# Patient Record
Sex: MALE | Race: WHITE | NOT HISPANIC OR LATINO | Employment: OTHER | URBAN - NONMETROPOLITAN AREA
[De-identification: names, ages, dates, MRNs, and addresses within clinical notes are randomized per-mention and may not be internally consistent; named-entity substitution may affect disease eponyms.]

---

## 2019-01-09 PROBLEM — S62.502A: Status: ACTIVE | Noted: 2019-01-09

## 2019-01-09 PROBLEM — F17.200 SMOKER: Chronic | Status: ACTIVE | Noted: 2019-01-09

## 2019-01-09 PROBLEM — S62.232A CLOSED DISPLACED FRACTURE OF BASE OF FIRST METACARPAL BONE OF LEFT HAND: Status: ACTIVE | Noted: 2019-01-09

## 2019-01-09 PROBLEM — F12.90 MARIJUANA USE: Status: ACTIVE | Noted: 2019-01-09

## 2019-01-09 PROBLEM — E66.9 OBESITY: Status: ACTIVE | Noted: 2019-01-09

## 2021-11-01 ENCOUNTER — HOSPITAL ENCOUNTER (OUTPATIENT)
Dept: RADIOLOGY | Facility: HOSPITAL | Age: 26
Discharge: HOME | End: 2021-11-01
Attending: STUDENT IN AN ORGANIZED HEALTH CARE EDUCATION/TRAINING PROGRAM
Payer: MEDICAID

## 2021-11-01 ENCOUNTER — OFFICE VISIT (OUTPATIENT)
Dept: PRIMARY CARE CLINIC | Facility: CLINIC | Age: 26
End: 2021-11-01
Payer: MEDICAID

## 2021-11-01 VITALS
HEART RATE: 96 BPM | WEIGHT: 176 LBS | SYSTOLIC BLOOD PRESSURE: 120 MMHG | BODY MASS INDEX: 26.67 KG/M2 | HEIGHT: 68 IN | DIASTOLIC BLOOD PRESSURE: 70 MMHG | OXYGEN SATURATION: 97 % | TEMPERATURE: 99 F

## 2021-11-01 DIAGNOSIS — M75.41 ROTATOR CUFF IMPINGEMENT SYNDROME OF RIGHT SHOULDER: ICD-10-CM

## 2021-11-01 DIAGNOSIS — M25.511 ARTHRALGIA OF RIGHT ACROMIOCLAVICULAR JOINT: ICD-10-CM

## 2021-11-01 DIAGNOSIS — E78.5 HYPERLIPIDEMIA, UNSPECIFIED HYPERLIPIDEMIA TYPE: ICD-10-CM

## 2021-11-01 DIAGNOSIS — M75.41 ROTATOR CUFF IMPINGEMENT SYNDROME OF RIGHT SHOULDER: Primary | ICD-10-CM

## 2021-11-01 DIAGNOSIS — M62.838 MUSCLE SPASMS OF NECK: ICD-10-CM

## 2021-11-01 PROBLEM — E66.9 OBESITY: Status: RESOLVED | Noted: 2019-01-09 | Resolved: 2021-11-01

## 2021-11-01 PROCEDURE — 99204 PR OFFICE/OUTPT VISIT, NEW, LEVL IV, 45-59 MIN: ICD-10-PCS | Mod: S$PBB,,, | Performed by: STUDENT IN AN ORGANIZED HEALTH CARE EDUCATION/TRAINING PROGRAM

## 2021-11-01 PROCEDURE — 99999 PR PBB SHADOW E&M-NEW PATIENT-LVL III: ICD-10-PCS | Mod: PBBFAC,,, | Performed by: STUDENT IN AN ORGANIZED HEALTH CARE EDUCATION/TRAINING PROGRAM

## 2021-11-01 PROCEDURE — 99204 OFFICE O/P NEW MOD 45 MIN: CPT | Mod: S$PBB,,, | Performed by: STUDENT IN AN ORGANIZED HEALTH CARE EDUCATION/TRAINING PROGRAM

## 2021-11-01 PROCEDURE — 99203 OFFICE O/P NEW LOW 30 MIN: CPT | Mod: PBBFAC,PN | Performed by: STUDENT IN AN ORGANIZED HEALTH CARE EDUCATION/TRAINING PROGRAM

## 2021-11-01 PROCEDURE — 73030 X-RAY EXAM OF SHOULDER: CPT | Mod: TC,RT

## 2021-11-01 PROCEDURE — 99999 PR PBB SHADOW E&M-NEW PATIENT-LVL III: CPT | Mod: PBBFAC,,, | Performed by: STUDENT IN AN ORGANIZED HEALTH CARE EDUCATION/TRAINING PROGRAM

## 2021-11-01 RX ORDER — CYCLOBENZAPRINE HCL 10 MG
10 TABLET ORAL 3 TIMES DAILY PRN
Qty: 30 TABLET | Refills: 0 | Status: SHIPPED | OUTPATIENT
Start: 2021-11-01 | End: 2021-11-11

## 2021-11-01 RX ORDER — METHYLPREDNISOLONE 32 MG/1
32 TABLET ORAL DAILY
COMMUNITY
End: 2021-11-03

## 2021-11-01 RX ORDER — IBUPROFEN 600 MG/1
600 TABLET ORAL EVERY 4 HOURS PRN
COMMUNITY
End: 2021-11-08

## 2021-11-08 ENCOUNTER — OFFICE VISIT (OUTPATIENT)
Dept: PRIMARY CARE CLINIC | Facility: CLINIC | Age: 26
End: 2021-11-08
Payer: MEDICAID

## 2021-11-08 VITALS
HEIGHT: 68 IN | TEMPERATURE: 99 F | BODY MASS INDEX: 28.49 KG/M2 | DIASTOLIC BLOOD PRESSURE: 72 MMHG | SYSTOLIC BLOOD PRESSURE: 118 MMHG | HEART RATE: 74 BPM | WEIGHT: 188 LBS

## 2021-11-08 DIAGNOSIS — G89.29 CHRONIC RIGHT SHOULDER PAIN: Primary | Chronic | ICD-10-CM

## 2021-11-08 DIAGNOSIS — M25.511 CHRONIC RIGHT SHOULDER PAIN: Primary | Chronic | ICD-10-CM

## 2021-11-08 DIAGNOSIS — E78.5 HYPERLIPIDEMIA, UNSPECIFIED HYPERLIPIDEMIA TYPE: ICD-10-CM

## 2021-11-08 DIAGNOSIS — F12.90 MARIJUANA USE: ICD-10-CM

## 2021-11-08 PROCEDURE — 99213 OFFICE O/P EST LOW 20 MIN: CPT | Mod: S$PBB,,, | Performed by: STUDENT IN AN ORGANIZED HEALTH CARE EDUCATION/TRAINING PROGRAM

## 2021-11-08 PROCEDURE — 99213 PR OFFICE/OUTPT VISIT, EST, LEVL III, 20-29 MIN: ICD-10-PCS | Mod: S$PBB,,, | Performed by: STUDENT IN AN ORGANIZED HEALTH CARE EDUCATION/TRAINING PROGRAM

## 2021-11-08 PROCEDURE — 99999 PR PBB SHADOW E&M-EST. PATIENT-LVL III: CPT | Mod: PBBFAC,,, | Performed by: STUDENT IN AN ORGANIZED HEALTH CARE EDUCATION/TRAINING PROGRAM

## 2021-11-08 PROCEDURE — 99999 PR PBB SHADOW E&M-EST. PATIENT-LVL III: ICD-10-PCS | Mod: PBBFAC,,, | Performed by: STUDENT IN AN ORGANIZED HEALTH CARE EDUCATION/TRAINING PROGRAM

## 2021-11-08 PROCEDURE — 99213 OFFICE O/P EST LOW 20 MIN: CPT | Mod: PBBFAC,PN | Performed by: STUDENT IN AN ORGANIZED HEALTH CARE EDUCATION/TRAINING PROGRAM

## 2021-11-08 RX ORDER — IBUPROFEN 600 MG/1
600 TABLET ORAL EVERY 6 HOURS PRN
Qty: 40 TABLET | Refills: 1 | Status: SHIPPED | OUTPATIENT
Start: 2021-11-08 | End: 2021-11-28

## 2021-11-08 RX ORDER — DEXTROMETHORPHAN HYDROBROMIDE, GUAIFENESIN 5; 100 MG/5ML; MG/5ML
650 LIQUID ORAL
Qty: 40 TABLET | Refills: 1 | Status: SHIPPED | OUTPATIENT
Start: 2021-11-08 | End: 2021-11-28

## 2021-11-29 ENCOUNTER — OFFICE VISIT (OUTPATIENT)
Dept: PRIMARY CARE CLINIC | Facility: CLINIC | Age: 26
End: 2021-11-29
Payer: MEDICAID

## 2021-11-29 VITALS
OXYGEN SATURATION: 97 % | TEMPERATURE: 98 F | HEIGHT: 68 IN | SYSTOLIC BLOOD PRESSURE: 125 MMHG | WEIGHT: 191.5 LBS | HEART RATE: 100 BPM | BODY MASS INDEX: 29.02 KG/M2 | DIASTOLIC BLOOD PRESSURE: 73 MMHG

## 2021-11-29 DIAGNOSIS — M75.41 ROTATOR CUFF IMPINGEMENT SYNDROME OF RIGHT SHOULDER: ICD-10-CM

## 2021-11-29 DIAGNOSIS — Z23 INFLUENZA VACCINE NEEDED: ICD-10-CM

## 2021-11-29 DIAGNOSIS — F12.90 MARIJUANA USE: ICD-10-CM

## 2021-11-29 DIAGNOSIS — M25.511 CHRONIC RIGHT SHOULDER PAIN: Primary | ICD-10-CM

## 2021-11-29 DIAGNOSIS — G89.29 CHRONIC RIGHT SHOULDER PAIN: Primary | ICD-10-CM

## 2021-11-29 DIAGNOSIS — R06.2 WHEEZING: ICD-10-CM

## 2021-11-29 DIAGNOSIS — M62.838 MUSCLE SPASMS OF NECK: ICD-10-CM

## 2021-11-29 PROCEDURE — 90686 IIV4 VACC NO PRSV 0.5 ML IM: CPT | Mod: PBBFAC,PN | Performed by: STUDENT IN AN ORGANIZED HEALTH CARE EDUCATION/TRAINING PROGRAM

## 2021-11-29 PROCEDURE — 99999 PR PBB SHADOW E&M-EST. PATIENT-LVL III: ICD-10-PCS | Mod: PBBFAC,,, | Performed by: STUDENT IN AN ORGANIZED HEALTH CARE EDUCATION/TRAINING PROGRAM

## 2021-11-29 PROCEDURE — 99213 OFFICE O/P EST LOW 20 MIN: CPT | Mod: S$PBB,,, | Performed by: STUDENT IN AN ORGANIZED HEALTH CARE EDUCATION/TRAINING PROGRAM

## 2021-11-29 PROCEDURE — 99213 PR OFFICE/OUTPT VISIT, EST, LEVL III, 20-29 MIN: ICD-10-PCS | Mod: S$PBB,,, | Performed by: STUDENT IN AN ORGANIZED HEALTH CARE EDUCATION/TRAINING PROGRAM

## 2021-11-29 PROCEDURE — 99213 OFFICE O/P EST LOW 20 MIN: CPT | Mod: PBBFAC,PN | Performed by: STUDENT IN AN ORGANIZED HEALTH CARE EDUCATION/TRAINING PROGRAM

## 2021-11-29 PROCEDURE — 99999 PR PBB SHADOW E&M-EST. PATIENT-LVL III: CPT | Mod: PBBFAC,,, | Performed by: STUDENT IN AN ORGANIZED HEALTH CARE EDUCATION/TRAINING PROGRAM

## 2021-11-29 RX ORDER — CYCLOBENZAPRINE HYDROCHLORIDE 7.5 MG/1
7.5 TABLET, FILM COATED ORAL 3 TIMES DAILY
Qty: 30 TABLET | Refills: 0 | Status: SHIPPED | OUTPATIENT
Start: 2021-11-29 | End: 2021-12-09

## 2021-11-29 RX ORDER — ALBUTEROL SULFATE 90 UG/1
2 AEROSOL, METERED RESPIRATORY (INHALATION) EVERY 6 HOURS PRN
Qty: 18 G | Refills: 0 | Status: SHIPPED | OUTPATIENT
Start: 2021-11-29 | End: 2021-11-29

## 2021-11-29 RX ORDER — ALBUTEROL SULFATE 90 UG/1
2 AEROSOL, METERED RESPIRATORY (INHALATION) EVERY 6 HOURS PRN
Qty: 18 G | Refills: 0 | Status: SHIPPED | OUTPATIENT
Start: 2021-11-29 | End: 2022-02-01 | Stop reason: ALTCHOICE

## 2021-11-29 RX ORDER — CYCLOBENZAPRINE HYDROCHLORIDE 7.5 MG/1
7.5 TABLET, FILM COATED ORAL 3 TIMES DAILY
Qty: 30 TABLET | Refills: 0 | Status: SHIPPED | OUTPATIENT
Start: 2021-11-29 | End: 2021-11-29

## 2021-12-01 ENCOUNTER — OFFICE VISIT (OUTPATIENT)
Dept: PRIMARY CARE CLINIC | Facility: CLINIC | Age: 26
End: 2021-12-01
Payer: MEDICAID

## 2021-12-01 VITALS
HEART RATE: 100 BPM | TEMPERATURE: 99 F | BODY MASS INDEX: 28.95 KG/M2 | SYSTOLIC BLOOD PRESSURE: 148 MMHG | OXYGEN SATURATION: 96 % | WEIGHT: 191 LBS | DIASTOLIC BLOOD PRESSURE: 92 MMHG | HEIGHT: 68 IN

## 2021-12-01 DIAGNOSIS — R06.02 SHORTNESS OF BREATH: ICD-10-CM

## 2021-12-01 DIAGNOSIS — R05.9 COUGH: ICD-10-CM

## 2021-12-01 DIAGNOSIS — R06.2 WHEEZING: ICD-10-CM

## 2021-12-01 DIAGNOSIS — J06.9 UPPER RESPIRATORY INFECTION, ACUTE: Primary | ICD-10-CM

## 2021-12-01 PROBLEM — R50.9 FEVER: Status: ACTIVE | Noted: 2021-12-01

## 2021-12-01 PROBLEM — R19.7 DIARRHEA: Status: RESOLVED | Noted: 2021-12-01 | Resolved: 2021-12-01

## 2021-12-01 PROBLEM — R19.7 DIARRHEA: Status: ACTIVE | Noted: 2021-12-01

## 2021-12-01 LAB — SARS-COV-2 RNA RESP QL NAA+PROBE: NOT DETECTED

## 2021-12-01 PROCEDURE — 99213 OFFICE O/P EST LOW 20 MIN: CPT | Mod: PBBFAC,PN | Performed by: STUDENT IN AN ORGANIZED HEALTH CARE EDUCATION/TRAINING PROGRAM

## 2021-12-01 PROCEDURE — 99213 OFFICE O/P EST LOW 20 MIN: CPT | Mod: S$PBB,,, | Performed by: STUDENT IN AN ORGANIZED HEALTH CARE EDUCATION/TRAINING PROGRAM

## 2021-12-01 PROCEDURE — 99999 PR PBB SHADOW E&M-EST. PATIENT-LVL III: CPT | Mod: PBBFAC,,, | Performed by: STUDENT IN AN ORGANIZED HEALTH CARE EDUCATION/TRAINING PROGRAM

## 2021-12-01 PROCEDURE — 99213 PR OFFICE/OUTPT VISIT, EST, LEVL III, 20-29 MIN: ICD-10-PCS | Mod: S$PBB,,, | Performed by: STUDENT IN AN ORGANIZED HEALTH CARE EDUCATION/TRAINING PROGRAM

## 2021-12-01 PROCEDURE — 99999 PR PBB SHADOW E&M-EST. PATIENT-LVL III: ICD-10-PCS | Mod: PBBFAC,,, | Performed by: STUDENT IN AN ORGANIZED HEALTH CARE EDUCATION/TRAINING PROGRAM

## 2021-12-01 PROCEDURE — U0002 COVID-19 LAB TEST NON-CDC: HCPCS | Performed by: STUDENT IN AN ORGANIZED HEALTH CARE EDUCATION/TRAINING PROGRAM

## 2021-12-08 ENCOUNTER — OFFICE VISIT (OUTPATIENT)
Dept: PRIMARY CARE CLINIC | Facility: CLINIC | Age: 26
End: 2021-12-08
Payer: MEDICAID

## 2021-12-08 VITALS
HEART RATE: 97 BPM | BODY MASS INDEX: 29.25 KG/M2 | HEIGHT: 68 IN | TEMPERATURE: 98 F | WEIGHT: 193 LBS | DIASTOLIC BLOOD PRESSURE: 97 MMHG | SYSTOLIC BLOOD PRESSURE: 155 MMHG

## 2021-12-08 DIAGNOSIS — J06.9 UPPER RESPIRATORY INFECTION, ACUTE: ICD-10-CM

## 2021-12-08 DIAGNOSIS — F12.90 MARIJUANA USE: ICD-10-CM

## 2021-12-08 DIAGNOSIS — F17.200 SMOKER: Chronic | ICD-10-CM

## 2021-12-08 DIAGNOSIS — R06.2 WHEEZING: ICD-10-CM

## 2021-12-08 DIAGNOSIS — R11.10 VOMITING, INTRACTABILITY OF VOMITING NOT SPECIFIED, PRESENCE OF NAUSEA NOT SPECIFIED, UNSPECIFIED VOMITING TYPE: Primary | ICD-10-CM

## 2021-12-08 DIAGNOSIS — A08.4 VIRAL ENTERITIS: ICD-10-CM

## 2021-12-08 DIAGNOSIS — E78.5 HYPERLIPIDEMIA, UNSPECIFIED HYPERLIPIDEMIA TYPE: ICD-10-CM

## 2021-12-08 PROBLEM — Z23 INFLUENZA VACCINE NEEDED: Status: RESOLVED | Noted: 2021-11-29 | Resolved: 2021-12-08

## 2021-12-08 LAB
INFLUENZA A, MOLECULAR: NEGATIVE
INFLUENZA B, MOLECULAR: NEGATIVE
SPECIMEN SOURCE: NORMAL

## 2021-12-08 PROCEDURE — 99999 PR PBB SHADOW E&M-EST. PATIENT-LVL IV: CPT | Mod: PBBFAC,,, | Performed by: STUDENT IN AN ORGANIZED HEALTH CARE EDUCATION/TRAINING PROGRAM

## 2021-12-08 PROCEDURE — 99213 PR OFFICE/OUTPT VISIT, EST, LEVL III, 20-29 MIN: ICD-10-PCS | Mod: S$PBB,,, | Performed by: STUDENT IN AN ORGANIZED HEALTH CARE EDUCATION/TRAINING PROGRAM

## 2021-12-08 PROCEDURE — 99214 OFFICE O/P EST MOD 30 MIN: CPT | Mod: PBBFAC,PN | Performed by: STUDENT IN AN ORGANIZED HEALTH CARE EDUCATION/TRAINING PROGRAM

## 2021-12-08 PROCEDURE — 87502 INFLUENZA DNA AMP PROBE: CPT | Performed by: STUDENT IN AN ORGANIZED HEALTH CARE EDUCATION/TRAINING PROGRAM

## 2021-12-08 PROCEDURE — 99213 OFFICE O/P EST LOW 20 MIN: CPT | Mod: S$PBB,,, | Performed by: STUDENT IN AN ORGANIZED HEALTH CARE EDUCATION/TRAINING PROGRAM

## 2021-12-08 PROCEDURE — 99999 PR PBB SHADOW E&M-EST. PATIENT-LVL IV: ICD-10-PCS | Mod: PBBFAC,,, | Performed by: STUDENT IN AN ORGANIZED HEALTH CARE EDUCATION/TRAINING PROGRAM

## 2021-12-08 RX ORDER — BENZONATATE 100 MG/1
CAPSULE ORAL
COMMUNITY
Start: 2021-12-05 | End: 2022-01-04 | Stop reason: ALTCHOICE

## 2021-12-08 RX ORDER — CYCLOBENZAPRINE HCL 5 MG
TABLET ORAL
COMMUNITY
Start: 2021-11-29 | End: 2021-12-09

## 2021-12-08 RX ORDER — DEXTROMETHORPHAN HYDROBROMIDE, GUAIFENESIN 5; 100 MG/5ML; MG/5ML
650 LIQUID ORAL EVERY 8 HOURS
COMMUNITY
End: 2022-07-11 | Stop reason: SDUPTHER

## 2021-12-08 RX ORDER — AMOXICILLIN 500 MG/1
500 TABLET, FILM COATED ORAL 3 TIMES DAILY
COMMUNITY
Start: 2021-12-05 | End: 2022-01-04 | Stop reason: ALTCHOICE

## 2021-12-08 RX ORDER — CETIRIZINE HYDROCHLORIDE 10 MG/1
10 TABLET ORAL DAILY
COMMUNITY
Start: 2021-12-05 | End: 2022-02-15 | Stop reason: SDUPTHER

## 2021-12-08 RX ORDER — IBUPROFEN 600 MG/1
600 TABLET ORAL 2 TIMES DAILY
COMMUNITY
End: 2022-01-04 | Stop reason: SDUPTHER

## 2021-12-09 PROBLEM — A08.4 VIRAL ENTERITIS: Status: ACTIVE | Noted: 2021-12-09

## 2022-01-04 ENCOUNTER — OFFICE VISIT (OUTPATIENT)
Dept: PRIMARY CARE CLINIC | Facility: CLINIC | Age: 27
End: 2022-01-04
Payer: MEDICAID

## 2022-01-04 VITALS
OXYGEN SATURATION: 98 % | TEMPERATURE: 99 F | SYSTOLIC BLOOD PRESSURE: 121 MMHG | BODY MASS INDEX: 29.08 KG/M2 | WEIGHT: 191.25 LBS | DIASTOLIC BLOOD PRESSURE: 64 MMHG | HEART RATE: 95 BPM

## 2022-01-04 DIAGNOSIS — M62.830 SPASM OF MUSCLE OF LOWER BACK: Primary | ICD-10-CM

## 2022-01-04 DIAGNOSIS — G89.29 CHRONIC RIGHT SHOULDER PAIN: ICD-10-CM

## 2022-01-04 DIAGNOSIS — M99.09 SOMATIC DYSFUNCTION OF BACK: ICD-10-CM

## 2022-01-04 DIAGNOSIS — M25.511 CHRONIC RIGHT SHOULDER PAIN: ICD-10-CM

## 2022-01-04 PROCEDURE — 98925 PR OSTEOPATHIC MANIP,1-2 BODY REGN: ICD-10-PCS | Mod: S$PBB,,, | Performed by: STUDENT IN AN ORGANIZED HEALTH CARE EDUCATION/TRAINING PROGRAM

## 2022-01-04 PROCEDURE — 1159F MED LIST DOCD IN RCRD: CPT | Mod: CPTII,,, | Performed by: STUDENT IN AN ORGANIZED HEALTH CARE EDUCATION/TRAINING PROGRAM

## 2022-01-04 PROCEDURE — 99999 PR PBB SHADOW E&M-EST. PATIENT-LVL III: ICD-10-PCS | Mod: PBBFAC,,, | Performed by: STUDENT IN AN ORGANIZED HEALTH CARE EDUCATION/TRAINING PROGRAM

## 2022-01-04 PROCEDURE — 99213 OFFICE O/P EST LOW 20 MIN: CPT | Mod: PBBFAC,PN | Performed by: STUDENT IN AN ORGANIZED HEALTH CARE EDUCATION/TRAINING PROGRAM

## 2022-01-04 PROCEDURE — 3008F PR BODY MASS INDEX (BMI) DOCUMENTED: ICD-10-PCS | Mod: CPTII,,, | Performed by: STUDENT IN AN ORGANIZED HEALTH CARE EDUCATION/TRAINING PROGRAM

## 2022-01-04 PROCEDURE — 99214 PR OFFICE/OUTPT VISIT, EST, LEVL IV, 30-39 MIN: ICD-10-PCS | Mod: S$PBB,25,, | Performed by: STUDENT IN AN ORGANIZED HEALTH CARE EDUCATION/TRAINING PROGRAM

## 2022-01-04 PROCEDURE — 99999 PR PBB SHADOW E&M-EST. PATIENT-LVL III: CPT | Mod: PBBFAC,,, | Performed by: STUDENT IN AN ORGANIZED HEALTH CARE EDUCATION/TRAINING PROGRAM

## 2022-01-04 PROCEDURE — 3074F SYST BP LT 130 MM HG: CPT | Mod: CPTII,,, | Performed by: STUDENT IN AN ORGANIZED HEALTH CARE EDUCATION/TRAINING PROGRAM

## 2022-01-04 PROCEDURE — 1160F RVW MEDS BY RX/DR IN RCRD: CPT | Mod: CPTII,,, | Performed by: STUDENT IN AN ORGANIZED HEALTH CARE EDUCATION/TRAINING PROGRAM

## 2022-01-04 PROCEDURE — 98925 OSTEOPATH MANJ 1-2 REGIONS: CPT | Mod: PBBFAC,PN | Performed by: STUDENT IN AN ORGANIZED HEALTH CARE EDUCATION/TRAINING PROGRAM

## 2022-01-04 PROCEDURE — 99214 OFFICE O/P EST MOD 30 MIN: CPT | Mod: S$PBB,25,, | Performed by: STUDENT IN AN ORGANIZED HEALTH CARE EDUCATION/TRAINING PROGRAM

## 2022-01-04 PROCEDURE — 3078F PR MOST RECENT DIASTOLIC BLOOD PRESSURE < 80 MM HG: ICD-10-PCS | Mod: CPTII,,, | Performed by: STUDENT IN AN ORGANIZED HEALTH CARE EDUCATION/TRAINING PROGRAM

## 2022-01-04 PROCEDURE — 3074F PR MOST RECENT SYSTOLIC BLOOD PRESSURE < 130 MM HG: ICD-10-PCS | Mod: CPTII,,, | Performed by: STUDENT IN AN ORGANIZED HEALTH CARE EDUCATION/TRAINING PROGRAM

## 2022-01-04 PROCEDURE — 98925 OSTEOPATH MANJ 1-2 REGIONS: CPT | Mod: S$PBB,,, | Performed by: STUDENT IN AN ORGANIZED HEALTH CARE EDUCATION/TRAINING PROGRAM

## 2022-01-04 PROCEDURE — 3008F BODY MASS INDEX DOCD: CPT | Mod: CPTII,,, | Performed by: STUDENT IN AN ORGANIZED HEALTH CARE EDUCATION/TRAINING PROGRAM

## 2022-01-04 PROCEDURE — 1159F PR MEDICATION LIST DOCUMENTED IN MEDICAL RECORD: ICD-10-PCS | Mod: CPTII,,, | Performed by: STUDENT IN AN ORGANIZED HEALTH CARE EDUCATION/TRAINING PROGRAM

## 2022-01-04 PROCEDURE — 1160F PR REVIEW ALL MEDS BY PRESCRIBER/CLIN PHARMACIST DOCUMENTED: ICD-10-PCS | Mod: CPTII,,, | Performed by: STUDENT IN AN ORGANIZED HEALTH CARE EDUCATION/TRAINING PROGRAM

## 2022-01-04 PROCEDURE — 3078F DIAST BP <80 MM HG: CPT | Mod: CPTII,,, | Performed by: STUDENT IN AN ORGANIZED HEALTH CARE EDUCATION/TRAINING PROGRAM

## 2022-01-04 RX ORDER — KETOROLAC TROMETHAMINE 30 MG/ML
30 INJECTION, SOLUTION INTRAMUSCULAR; INTRAVENOUS
Status: COMPLETED | OUTPATIENT
Start: 2022-01-04 | End: 2022-01-04

## 2022-01-04 RX ORDER — IBUPROFEN 600 MG/1
600 TABLET ORAL 2 TIMES DAILY
Qty: 28 TABLET | Refills: 0 | Status: SHIPPED | OUTPATIENT
Start: 2022-01-04 | End: 2022-01-18

## 2022-01-04 RX ORDER — DEXTROMETHORPHAN HYDROBROMIDE, GUAIFENESIN 5; 100 MG/5ML; MG/5ML
650 LIQUID ORAL EVERY 8 HOURS
Qty: 90 TABLET | Refills: 0 | Status: SHIPPED | OUTPATIENT
Start: 2022-01-04 | End: 2022-02-01

## 2022-01-04 RX ORDER — BACLOFEN 10 MG/1
10 TABLET ORAL 3 TIMES DAILY
Qty: 21 TABLET | Refills: 0 | Status: SHIPPED | OUTPATIENT
Start: 2022-01-04 | End: 2022-02-01

## 2022-01-04 RX ADMIN — KETOROLAC TROMETHAMINE 30 MG: 30 INJECTION, SOLUTION INTRAMUSCULAR; INTRAVENOUS at 01:01

## 2022-01-04 NOTE — ASSESSMENT & PLAN NOTE
"OMT EXAMINATION AND PROCEDURE NOTE  Somatic dysfunction was diagnosed in the following body regions as noted based on tissue texture, tenderness, asymmetry and/or restricted motion. The corresponding ICD-10 codes are including for each somatic dysfunction diagnosed independently of any other diagnosis listed.  M99.2 Thoracic Region: T10-12 right rotation, rib raising over paraspinal bilaterally  M99.3 Lumbar Region: L3-5 left rotation, left lumbar fullness and tenderness     OMT was performed after verbal informed consent on each of the somatic dysfunctions listed above.  All areas were noted to be improved upon re-examination after treatment. The following OMT techniques were employed:     The patient noted no adverse effects and some relief of symptoms immediately following treatment. Side effects were discussed. Patient was discharged with home care instructions and anticipatory guidance.    The following CPT code is applied (marked with "x"):  29686 OMT to   1-2 regions: soft tissues, counterstrain over left lumbar region, two sites    Abbreviations Used: BLT - balanced ligamentous tension; CH - Pillai reflexes; CR - cranial; CS - counterstrain; , FPR - facilitated positional release; HVLA - high velocity, low amplitude; LYMPH -lymphatic; ME - muscle energy; MFR - myofascial release; ligamentous articular strain; OMT -osteopathic manipulative treatment; ST - soft tissue; VISC - visceral  "

## 2022-01-04 NOTE — LETTER
January 4, 2022      [address], [address] [place] [address]  Phone: [phone]  Fax: [phone]       Patient: Srinivas Huynh   YOB: 1995  Date of Visit: 01/04/2022    To Whom It May Concern:    Elena Huynh  was at [facility] on 01/04/2022. The patient may return to work on 1/5/2022 with no restrictions. If you have any questions or concerns, or if I can be of further assistance, please do not hesitate to contact me.    Sincerely,    Shonda Weston, DO

## 2022-01-04 NOTE — PROGRESS NOTES
"[facility] Note    Chief Complaint      Chief Complaint   Patient presents with    Back Pain    Arm Pain     Chronic shoulder, ROM remains, but noticed increased clicking and "catching" sensation over right shoulder.        History of Present Illness      Srinivas Huynh is a 26 y.o. male who presents today for Back Pain and Arm Pain (Chronic shoulder, ROM remains, but noticed increased clicking and "catching" sensation over right shoulder. )   .    Past Medical History:  Past Medical History:   Diagnosis Date    Bronchitis     Fracture of first metacarpal     High cholesterol     High triglycerides     HLD (hyperlipidemia)     Left hand fracture     Marijuana use 1/9/2019    Muscle spasms of neck 11/1/2021    Obesity 1/9/2019    Smoker 1/9/2019       Past Surgical History:   has a past surgical history that includes sinus sx; Myringotomy w/ tubes; Adenoidectomy; and Closed reduction of fracture of hand with percutaneous pinning (Left, 1/10/2019).    Family History:  family history is not on file.     Social History:  Social History     Tobacco Use    Smoking status: Current Every Day Smoker     Packs/day: 1.00     Years: 12.00     Pack years: 12.00     Types: Cigarettes    Smokeless tobacco: Never Used   Substance Use Topics    Alcohol use: No    Drug use: Yes     Types: Marijuana     Comment: 2 joints daily, 2 g daily per patient       I personally reviewed all past medical, surgical, social and family history.    Review of Systems   Constitutional: Negative for chills, fever, malaise/fatigue and weight loss.   HENT: Negative for congestion, ear discharge, ear pain, sinus pain and sore throat.    Eyes: Negative for blurred vision, pain, discharge and redness.        Wears corrective lens   Respiratory: Negative for cough, hemoptysis, sputum production, shortness of breath, wheezing and stridor.    Cardiovascular: Negative for chest pain, palpitations and leg swelling. "   Gastrointestinal: Negative for abdominal pain, blood in stool, constipation, diarrhea, nausea and vomiting.   Genitourinary: Negative for dysuria, frequency and hematuria.   Musculoskeletal: Positive for back pain (rigth shoulder pain chronic). Negative for joint pain, myalgias and neck pain.   Skin: Negative.  Negative for rash.   Neurological: Negative for dizziness, tingling, speech change, focal weakness, seizures, weakness and headaches.   Psychiatric/Behavioral: Negative for depression and suicidal ideas. The patient is not nervous/anxious.       Medications:  Outpatient Encounter Medications as of 2022   Medication Sig Dispense Refill    acetaminophen (TYLENOL) 650 MG TbSR Take 650 mg by mouth every 8 (eight) hours. Tylenol arthritis 8hr Extended release      albuterol (PROVENTIL HFA) 90 mcg/actuation inhaler Inhale 2 puffs into the lungs every 6 (six) hours as needed for Wheezing. Rescue 18 g 0    cetirizine (ZYRTEC) 10 MG tablet Take 10 mg by mouth once daily.      acetaminophen (TYLENOL) 650 MG TbSR Take 1 tablet (650 mg total) by mouth every 8 (eight) hours. 90 tablet 0    amoxicillin (AMOXIL) 500 MG Tab Take 500 mg by mouth 3 (three) times daily.      baclofen (LIORESAL) 10 MG tablet Take 1 tablet (10 mg total) by mouth 3 (three) times daily. for 7 days 21 tablet 0    benzonatate (TESSALON) 100 MG capsule SMARTSI Capsule(s) By Mouth Every 8 Hours PRN      ibuprofen (ADVIL,MOTRIN) 600 MG tablet Take 1 tablet (600 mg total) by mouth 2 (two) times a day. for 14 days 28 tablet 0    [DISCONTINUED] ibuprofen (ADVIL,MOTRIN) 600 MG tablet Take 600 mg by mouth 2 (two) times a day.       Facility-Administered Encounter Medications as of 2022   Medication Dose Route Frequency Provider Last Rate Last Admin    ketorolac injection 30 mg  30 mg Intramuscular 1 time in Clinic/HOD Shonda Weston DO           Allergies:  Review of patient's allergies indicates:  No Known Allergies    Health  Maintenance:  Immunization History   Administered Date(s) Administered    COVID-19, MRNA, LN-S, PF (MODERNA FULL 0.5 ML DOSE) 09/14/2021    Influenza - Quadrivalent - PF *Preferred* (6 months and older) 11/29/2021      Health Maintenance   Topic Date Due    TETANUS VACCINE  11/01/2022 (Originally 5/21/2015)    HPV Vaccines (1 - Male 2-dose series) 11/01/2022 (Originally 5/21/2008)    Hepatitis C Screening  Completed    Lipid Panel  Completed        Physical Exam      Vital Signs  Temp: 98.5 °F (36.9 °C)  Temp src: Oral  Pulse: 95  SpO2: 98 %  BP: 121/64  BP Location: Right arm  Patient Position: Sitting  Pain Score:   8  Height and Weight  Weight: 86.7 kg (191 lb 4 oz)]    Physical Exam  Musculoskeletal:      Right shoulder: Tenderness (mild on rotation) and crepitus present. Normal strength.      Cervical back: Bony tenderness present. No swelling or tenderness.      Thoracic back: Tenderness (paraspinal fullness extending thoracic to lumbar, tenderness over right) present. No edema, deformity or bony tenderness. Normal range of motion.      Lumbar back: Spasms (left L3-5 paraspinal fullness) and tenderness (focal, non radiating) present. No edema or bony tenderness. Decreased range of motion (left side bent, right rotationt restricted). Negative right straight leg raise test and negative left straight leg raise test.        Assessment/Plan     Srinivas Huynh is a 26 y.o.male with:    Problem List Items Addressed This Visit        Orthopedic    Chronic right shoulder pain    Relevant Orders    Ambulatory referral/consult to Orthopedics    Somatic dysfunction of back    Current Assessment & Plan     OMT EXAMINATION AND PROCEDURE NOTE  Somatic dysfunction was diagnosed in the following body regions as noted based on tissue texture, tenderness, asymmetry and/or restricted motion. The corresponding ICD-10 codes are including for each somatic dysfunction diagnosed independently of any other diagnosis  "listed.  M99.2 Thoracic Region: T10-12 right rotation, rib raising over paraspinal bilaterally  M99.3 Lumbar Region: L3-5 left rotation, left lumbar fullness and tenderness     OMT was performed after verbal informed consent on each of the somatic dysfunctions listed above.  All areas were noted to be improved upon re-examination after treatment. The following OMT techniques were employed:     The patient noted no adverse effects and some relief of symptoms immediately following treatment. Side effects were discussed. Patient was discharged with home care instructions and anticipatory guidance.    The following CPT code is applied (marked with "x"):  99437 OMT to   1-2 regions: soft tissues, counterstrain over left lumbar region, two sites    Abbreviations Used: BLT - balanced ligamentous tension; CH - Pillai reflexes; CR - cranial; CS - counterstrain; , FPR - facilitated positional release; HVLA - high velocity, low amplitude; LYMPH -lymphatic; ME - muscle energy; MFR - myofascial release; ligamentous articular strain; OMT -osteopathic manipulative treatment; ST - soft tissue; VISC - visceral            Other    Spasm of muscle of lower back - Primary    Relevant Medications    ketorolac injection 30 mg    baclofen (LIORESAL) 10 MG tablet    ibuprofen (ADVIL,MOTRIN) 600 MG tablet        Other than changes above, cont current medications and maintain follow up with specialists.  Follow up in about 1 week (around 1/11/2022).    Future Appointments   Date Time Provider Department Center   1/7/2022  8:15 AM Severiano Carnes NP [facility] ORTHO [facility]     Kazumi G Yoshinaga, DO [facility]                  "

## 2022-01-04 NOTE — ASSESSMENT & PLAN NOTE
Past right scapular winging has improved and range of motion has improved, but continues to have clicking on rotation. Pain is managed with Tylenol and ibuprofen.   Home exercises continue. Patient does manual work with overhead activities.   Will refer to orthopedic surgery for evaluation.   - continue Tylenol  mg with Ibuprofen 600 mg every 6 hours PRN  - continue with upper extremity, impingement stretch and exercises  - home exercises provided for strengthening shoulder girdle  - f/u orthopedic surgery

## 2022-01-07 ENCOUNTER — OFFICE VISIT (OUTPATIENT)
Dept: ORTHOPEDICS | Facility: CLINIC | Age: 27
End: 2022-01-07
Payer: MEDICAID

## 2022-01-07 VITALS
WEIGHT: 191 LBS | OXYGEN SATURATION: 98 % | HEIGHT: 68 IN | BODY MASS INDEX: 28.95 KG/M2 | HEART RATE: 96 BPM | TEMPERATURE: 97 F

## 2022-01-07 DIAGNOSIS — M67.911 ROTATOR CUFF DISORDER, RIGHT: ICD-10-CM

## 2022-01-07 DIAGNOSIS — M75.41 ROTATOR CUFF IMPINGEMENT SYNDROME OF RIGHT SHOULDER: Primary | ICD-10-CM

## 2022-01-07 DIAGNOSIS — M25.511 ACUTE PAIN OF RIGHT SHOULDER: ICD-10-CM

## 2022-01-07 DIAGNOSIS — R29.898 WEAKNESS OF SHOULDER: ICD-10-CM

## 2022-01-07 PROCEDURE — 3008F PR BODY MASS INDEX (BMI) DOCUMENTED: ICD-10-PCS | Mod: CPTII,,, | Performed by: NURSE PRACTITIONER

## 2022-01-07 PROCEDURE — 1160F RVW MEDS BY RX/DR IN RCRD: CPT | Mod: CPTII,,, | Performed by: NURSE PRACTITIONER

## 2022-01-07 PROCEDURE — 1160F PR REVIEW ALL MEDS BY PRESCRIBER/CLIN PHARMACIST DOCUMENTED: ICD-10-PCS | Mod: CPTII,,, | Performed by: NURSE PRACTITIONER

## 2022-01-07 PROCEDURE — 99214 OFFICE O/P EST MOD 30 MIN: CPT | Mod: PBBFAC | Performed by: NURSE PRACTITIONER

## 2022-01-07 PROCEDURE — 1159F PR MEDICATION LIST DOCUMENTED IN MEDICAL RECORD: ICD-10-PCS | Mod: CPTII,,, | Performed by: NURSE PRACTITIONER

## 2022-01-07 PROCEDURE — 99999 PR PBB SHADOW E&M-EST. PATIENT-LVL IV: CPT | Mod: PBBFAC,,, | Performed by: NURSE PRACTITIONER

## 2022-01-07 PROCEDURE — 1159F MED LIST DOCD IN RCRD: CPT | Mod: CPTII,,, | Performed by: NURSE PRACTITIONER

## 2022-01-07 PROCEDURE — 99203 PR OFFICE/OUTPT VISIT, NEW, LEVL III, 30-44 MIN: ICD-10-PCS | Mod: S$PBB,,, | Performed by: NURSE PRACTITIONER

## 2022-01-07 PROCEDURE — 3008F BODY MASS INDEX DOCD: CPT | Mod: CPTII,,, | Performed by: NURSE PRACTITIONER

## 2022-01-07 PROCEDURE — 99999 PR PBB SHADOW E&M-EST. PATIENT-LVL IV: ICD-10-PCS | Mod: PBBFAC,,, | Performed by: NURSE PRACTITIONER

## 2022-01-07 PROCEDURE — 99203 OFFICE O/P NEW LOW 30 MIN: CPT | Mod: S$PBB,,, | Performed by: NURSE PRACTITIONER

## 2022-01-07 NOTE — PROGRESS NOTES
Subjective:       Srinivas Huynh is a 26 y.o. left handed male referred by Dr Weston for evaluation and treatment right  shoulder pain. This is evaluated as a personal injury. The pain is described as burning and sharp.  The onset of the pain started over 2 months ago. He states that he sustained an electrical shock while connecting lights. He states that he has had on and off shoulder pain but the pain has been more intense with decreased ROM since the injury. He states that he was having numbness in the right arm and hand but has improved since injury. He reports weakness in the shoulder and difficulty lifting at times. He has had greater than 6 weeks conservative care with his PCP to include rest, guided exercises, muscle relaxer, Tylenol and Nsaid. He has also tried heat and ice. He has had previous radiographs showing mild DJD in the shoulder. He rates his pain level as 5/10 and occurs constantly. Location is global. No history of dislocation. Symptoms are aggravated by all activities.     Related history: negative for prior surgery, trauma, arthritis or disorders.    Outside reports reviewed: office notes and radiology reports.    The following portions of the patient's history were reviewed and updated as appropriate: allergies, current medications, past family history, past medical history, past social history, past surgical history and problem list.   Review of Systems   Constitutional: Negative.  Negative for fever.   Musculoskeletal: Negative.    Skin: Negative.    Neurological: Negative.    All other systems reviewed and are negative.     Objective:    NVI  General:  alert, appears stated age and cooperative   Gait:  Normal.      Right Shoulder  Bruising:   absent   Crepitus:  AC joint   Joint Tenderness:  AC joint, biceps tendon, posterior acromial   Active ROM:  AROM decreased with ER, IR and abduction.    Neer:   positive   Bell  positive   Speeds negative   Cross arm negative   Empty can  positive   Drop arm Weakness noted in rotator cuff   Stability:   normal   Apprehension Sign:   negative   Atrophy:   none noted   Strength:  biceps 5/5, triceps 5/5, abduction 4/5, adduction 4/5   Contralateral shoulder was without deficit.   Brisk cap refill.    Imaging  X-Ray: Reviewed from 11/01/21. Mild OA changes only.       Assessment:     RT shoulder pain, rotator cuff disorder, and muscular weakness post electrical injury.     Plan:     1. Referral for shoulder MRI to evaluate for rotator cuff injury post electrical injury. Onset > 6 weeks under care of PCP. Has done home directed exercises. Continued rotator cuff weakness, limited ROM and discomfort. He has had trial of rest, Nsaid and Tylenol.   2. Continue previous meds per PCP.   3. Continue home directed ROM and strengthening exercises.   4. RTC post MRI for review.   5. He did not have any further questions.

## 2022-01-07 NOTE — LETTER
January 7, 2022      [facility]  [address] [place] [address], [address]  [facility] [address]  Phone: [phone]  Fax: [phone]       Patient: Srinivas Huynh   YOB: 1995  Date of Visit: 01/07/2022    To Whom It May Concern:    Elena Huynh  was at [facility] on 01/07/2022. The patient may return to work on 01/07/2022 with no restrictions. If you have any questions or concerns, or if I can be of further assistance, please do not hesitate to contact me.    Sincerely,    Severiano Carnes NP

## 2022-01-07 NOTE — PATIENT INSTRUCTIONS
Patient Education       Shoulder Impingement   The Basics   Written by the doctors and editors at [facility]   What is shoulder impingement? -- Shoulder impingement is a condition that causes pain in the shoulder. It happens when a tendon or bursa in the shoulder gets squeezed between the bones that make up the shoulder. A tendon is a strong band of tissue that connects a muscle to a bone. A bursa is a small fluid-filled sac that sits near a bone.  People can get shoulder impingement if they do a lot of work or a sport that involves stretching or lifting the arms overhead.  Shoulder impingement can lead to other problems, such as bursitis, which is when a bursa gets irritated or swollen, or rotator cuff injuries. The rotator cuff is made up of 4 shoulder muscles and their tendons.  What are the symptoms of shoulder impingement? -- Most people have pain in the front of the shoulder. The pain is usually worse with lifting or reaching overhead.  Will I need tests? -- You might. Your doctor or nurse will talk with you and do an exam. He or she might also do an imaging test such as an X-ray or ultrasound of your shoulder. An ultrasound uses sound waves to create pictures of the inside of the body.  How is shoulder impingement treated? -- In most cases, the condition will get better on its own, but it can take weeks to months. To help your shoulder get better, you can:  · Rest your shoulder - Avoid reaching overhead or across your chest, lifting, leaning on your elbows, or lying on your shoulder.  · Ice your shoulder - Put a cold gel pack, bag of ice, or bag of frozen vegetables on the injured area every 1 to 2 hours, for 15 minutes each time. Ice often helps after you have used your arm a lot. Put a thin towel between the ice (or other cold object) and your skin.  · Take a pain-relieving medicine - Ask your doctor or nurse about taking an over-the-counter medicine, such as acetaminophen (sample brand name: Tylenol),  "ibuprofen (sample brand names: Advil, Motrin), or naproxen (sample brand names: Aleve, Naprosyn).  Is there anything I can do on my own to feel better? -- Yes. Different exercises can help your shoulder get better. It's important to work with an expert to learn which exercises to do and how to do them the right way. This usually involves seeing an exercise expert such as a physical therapist or .  To keep your shoulder from getting too stiff, you can do an exercise called the pendulum swing. To do this exercise, let your arm relax and hang down while you sit or stand. Move your arm back and forth, then side to side, and then around in small circles (figure 1). Try to do this exercise for 5 minutes, 1 or 2 times a day.  Other types of exercises can help make the shoulder muscles stronger. Your physical therapist can show you how to do these types of exercises. They will tell you when to start them and how often to do them.  When you do shoulder exercises, it's important to:  · Warm up your shoulder first. You can do this by taking a hot shower or bath, or just using warm moist towels or a heating pad.  · Start slowly and make the exercises harder over time.  · Know that some soreness is normal. If you have sharp or tearing pain, stop what you're doing and let your doctor or nurse know.  Some doctors might try something called "kinesiology tape." This involves putting a special type of stretchy tape on certain parts of your shoulder. The tape is meant to support the shoulder and relieve pain. There is not a lot of good evidence that this works, but some people feel that it helps.  What if my symptoms don't get better? -- Usually, doctors suggest trying physical therapy (exercise) for at least 3 months. If your symptoms don't get better after this, your doctor might suggest another treatment, such as getting a shot of medicine into your shoulder. This sometimes helps relieve pain for a short time.  If " your doctor thinks you might have a tear or another type of shoulder injury, he or she might refer you to a surgeon. But in most cases, surgery does not work well in people who just have shoulder impingement without another injury.  All topics are updated as new evidence becomes available and our peer review process is complete.  This topic retrieved from [employer] on: Sep 21, 2021.  Topic 99689 Version 5.0  Release: 29.4.2 - C29.263  © 2021 UpToDate, Inc. and/or its affiliates. All rights reserved.  figure 1: Pendulum swing     To do this exercise, you can sit or stand. Relax your arm and let it hang down. Move your arm back and forth, then side to side, and then around in small circles in both directions. After about a week, you can make the exercise harder by making bigger movements or holding a weight in your hand.  Graphic 79246 Version 3.0    Consumer Information Use and Disclaimer   This information is not specific medical advice and does not replace information you receive from your health care provider. This is only a brief summary of general information. It does NOT include all information about conditions, illnesses, injuries, tests, procedures, treatments, therapies, discharge instructions or life-style choices that may apply to you. You must talk with your health care provider for complete information about your health and treatment options. This information should not be used to decide whether or not to accept your health care provider's advice, instructions or recommendations. Only your health care provider has the knowledge and training to provide advice that is right for you. The use of this information is governed by the [employer] End User License Agreement, available at https://www.Alicanto.[employer]/en/solutions/People and Pages/about/clemente.The use of [employer] content is governed by the [employer] Terms of Use. ©2021 UpToDate, Inc. All rights reserved.  Copyright   © 2021 UpToDate, Inc. and/or its  affiliates. All rights reserved.

## 2022-01-14 ENCOUNTER — HOSPITAL ENCOUNTER (OUTPATIENT)
Dept: RADIOLOGY | Facility: HOSPITAL | Age: 27
Discharge: HOME | End: 2022-01-14
Attending: NURSE PRACTITIONER
Payer: MEDICAID

## 2022-01-14 DIAGNOSIS — M25.511 ACUTE PAIN OF RIGHT SHOULDER: ICD-10-CM

## 2022-01-14 PROCEDURE — 73221 MRI JOINT UPR EXTREM W/O DYE: CPT | Mod: TC,RT

## 2022-01-19 ENCOUNTER — OFFICE VISIT (OUTPATIENT)
Dept: ORTHOPEDICS | Facility: CLINIC | Age: 27
End: 2022-01-19
Payer: MEDICAID

## 2022-01-19 DIAGNOSIS — S43.51XD ACROMIOCLAVICULAR SPRAIN, RIGHT, SUBSEQUENT ENCOUNTER: ICD-10-CM

## 2022-01-19 DIAGNOSIS — M75.41 ROTATOR CUFF IMPINGEMENT SYNDROME OF RIGHT SHOULDER: ICD-10-CM

## 2022-01-19 DIAGNOSIS — R29.898 WEAKNESS OF SHOULDER: Primary | ICD-10-CM

## 2022-01-19 PROCEDURE — 1159F MED LIST DOCD IN RCRD: CPT | Mod: CPTII,,, | Performed by: NURSE PRACTITIONER

## 2022-01-19 PROCEDURE — 99999 PR PBB SHADOW E&M-EST. PATIENT-LVL II: ICD-10-PCS | Mod: PBBFAC,,, | Performed by: NURSE PRACTITIONER

## 2022-01-19 PROCEDURE — 1159F PR MEDICATION LIST DOCUMENTED IN MEDICAL RECORD: ICD-10-PCS | Mod: CPTII,,, | Performed by: NURSE PRACTITIONER

## 2022-01-19 PROCEDURE — 1160F RVW MEDS BY RX/DR IN RCRD: CPT | Mod: CPTII,,, | Performed by: NURSE PRACTITIONER

## 2022-01-19 PROCEDURE — 1160F PR REVIEW ALL MEDS BY PRESCRIBER/CLIN PHARMACIST DOCUMENTED: ICD-10-PCS | Mod: CPTII,,, | Performed by: NURSE PRACTITIONER

## 2022-01-19 PROCEDURE — 99213 OFFICE O/P EST LOW 20 MIN: CPT | Mod: S$PBB,,, | Performed by: NURSE PRACTITIONER

## 2022-01-19 PROCEDURE — 99999 PR PBB SHADOW E&M-EST. PATIENT-LVL II: CPT | Mod: PBBFAC,,, | Performed by: NURSE PRACTITIONER

## 2022-01-19 PROCEDURE — 99213 PR OFFICE/OUTPT VISIT, EST, LEVL III, 20-29 MIN: ICD-10-PCS | Mod: S$PBB,,, | Performed by: NURSE PRACTITIONER

## 2022-01-19 PROCEDURE — 99212 OFFICE O/P EST SF 10 MIN: CPT | Mod: PBBFAC | Performed by: NURSE PRACTITIONER

## 2022-01-19 NOTE — PROGRESS NOTES
Subjective:      Follow up visit/MRI review:    Srinivas Huynh is a 26 y.o.returns for a follow up visit for  right shoulder pain. He is here for MRI review. He states that he has improving ROM but continues with anterior shoulder pain as previous, worse with usage. He denies any numbness in the arm today. He reports weakness in the shoulder as previous. He rates his pain level as 5/10. Symptoms are aggravated by all activities.      Review of Systems   Constitutional: Negative.  Negative for fever.   Musculoskeletal: Negative.    Skin: Negative.    Neurological: Negative.    All other systems reviewed and are negative.     Objective:    NVI  General:  alert, appears stated age and cooperative   Gait:  Normal.       Right Shoulder  Bruising:   absent   Crepitus:  AC joint   Joint Tenderness:  AC joint, biceps tendon   Active ROM:  AROM improved from prior visit.   Neer:   positive- mild   Bell  positive- mild   Speeds negative   Cross arm positive   Empty can positive   Drop arm Weakness noted in rotator cuff   Stability:   normal   Apprehension Sign:   negative   Atrophy:   none noted   Strength:  biceps 5/5, triceps 5/5, abduction 4/5, adduction 4/5   Contralateral shoulder was without deficit.      MRI RT shoulder:      1. Significant edema at the acromioclavicular joint suggesting a sprain.  2. Small elliptical bony density distal to the clavicle suspicious for a small distal clavicle fracture  3. Small 5 mm cyst in the supraspinatus muscle at the musculotendinous junction  4. Partial width full-thickness tear or interstitial tear of the anterior supraspinatus  5. Significant edema and thickening of the biceps at its insertion at the biceps anchor suggesting tendinopathy or partial tear      Assessment:      RT shoulder pain due to AC joint sprain, partial tear/tendonosis supraspinatus, biceps tendonitis.   RT UE weakness.       Plan:      1. MRI RT shoulder reviewed, see report.   2. Will continue  conservative treatment.   3. Will add Pennsaid bid(sample) to the AC joint and anterior shoulder. Ice.  Over head and cross arm motion rest.   4. Continue home directed ROM and strengthening exercises.   5. RTC 3-4 weeks for follow up. Will make referral to shoulder specialist if pain or weakness continues.

## 2022-01-19 NOTE — LETTER
January 19, 2022      [facility]  [address] [place] [address], [address]  [facility] [address]  Phone: [phone]  Fax: [phone]       Patient: Srinivas Huynh   YOB: 1995  Date of Visit: 01/19/2022    To Whom It May Concern:    Elena Huynh  was at [facility] on 01/19/2022. The patient may return to work on 01/20/22 with no restrictions. If you have any questions or concerns, or if I can be of further assistance, please do not hesitate to contact me.    Sincerely,    Severiano Carnes NP

## 2022-01-19 NOTE — PATIENT INSTRUCTIONS
Patient Education        Shoulder   The Basics   Written by the doctors and editors at [facility]   What is a  shoulder? -- A  shoulder is a condition that causes shoulder pain and swelling. It happens when certain ligaments in the shoulder joint tear or get stretched too much. Ligaments are strong bands of tissue that connect bones to other bones. The shoulder joint is made up of 3 bones: the collar bone, the shoulder blade, and the upper arm bone.  The most common causes of a  shoulder are falling on the shoulder or getting hit in the shoulder.  A  shoulder can be mild or severe, depending on how many ligaments are torn.  What are the symptoms of a  shoulder? -- Symptoms can be mild or severe and usually include:  · Shoulder pain  · Swelling in the shoulder  Will I need tests? -- You might. Your doctor or nurse will talk with you and do an exam. They will also probably do X-rays of your shoulder.  How is a  shoulder treated? -- Most  shoulders heal on their own, but they can take weeks to months to heal completely. To help your shoulder heal, you can:  · Rest the shoulder - Avoid lifting things, reaching overhead or across your chest, or sleeping on that shoulder.  · Use an arm sling to protect your shoulder and keep it still  · Ice your shoulder - Put a cold gel pack, bag of ice, or bag of frozen vegetables on the injured area every 1 to 2 hours, for 15 minutes each time. Put a thin towel between the ice (or other cold object) and your skin. Use the ice (or other cold object) for at least 6 hours after your injury. Some people find it helpful to ice longer, even up to 2 days after their injury.  · Take a pain-relieving medicine - Ask your doctor or nurse about taking an over-the-counter medicine for your pain, such as acetaminophen (sample brand name: Tylenol), ibuprofen (sample brand names: Advil, Motrin), or naproxen (sample brand names:  Aleve, Naprosyn).  If you have a severe  shoulder, you might need surgery.  Is there anything I can do on my own to feel better? -- Yes. Different exercises can help your shoulder get better.  To keep your shoulder from getting too stiff, you can do an exercise called the pendulum stretch. To do this exercise, let your arm relax and hang down while you sit or stand. Move your arm back and forth, then side to side, and then around in small circles (figure 1). Try to do this exercise for 5 minutes, 1 or 2 times a day.  Other exercises can help strengthen the muscles around your shoulder. Your doctor, nurse, or physical therapist (exercise expert) can show you how to do these types of exercises. They will tell you when to start them and how often to do them.  When you do shoulder exercises, it's important to:  · Warm up your shoulder first by taking a hot shower or bath, or putting a heating pad on it.  · Start slowly and make the exercises harder over time. For example, when you do the pendulum stretch, keep the circles you make with your arm small at first. Over time, make this exercise harder by making bigger circles or holding weights in your hand.  · Know that some soreness is normal. If you have sharp or tearing pain, stop what you're doing and let your doctor or nurse know.  When will I be able to do my usual activities again? -- It depends on how severe your  shoulder is. If your injury is mild, you might be able to return to your usual activities after a few days. If your injury is more serious, it might take weeks to months.  All topics are updated as new evidence becomes available and our peer review process is complete.  This topic retrieved from [employer] on: Sep 21, 2021.  Topic 04679 Version 6.0  Release: 29.4.2 - C29.263  © 2021 UpToDate, Inc. and/or its affiliates. All rights reserved.  figure 1: Pendulum swing     To do this exercise, you can sit or stand. Relax your arm and let it  hang down. Move your arm back and forth, then side to side, and then around in small circles in both directions. After about a week, you can make the exercise harder by making bigger movements or holding a weight in your hand.  Graphic 34143 Version 3.0    Consumer Information Use and Disclaimer   This information is not specific medical advice and does not replace information you receive from your health care provider. This is only a brief summary of general information. It does NOT include all information about conditions, illnesses, injuries, tests, procedures, treatments, therapies, discharge instructions or life-style choices that may apply to you. You must talk with your health care provider for complete information about your health and treatment options. This information should not be used to decide whether or not to accept your health care provider's advice, instructions or recommendations. Only your health care provider has the knowledge and training to provide advice that is right for you. The use of this information is governed by the [employer] End User License Agreement, available at https://www.[employer]/en/solutions/[employer]/about/clemente.The use of [employer] content is governed by the [employer] Terms of Use. ©2021 UpToDate, Inc. All rights reserved.  Copyright   © 2021 UpToDate, Inc. and/or its affiliates. All rights reserved.

## 2022-02-01 ENCOUNTER — OFFICE VISIT (OUTPATIENT)
Dept: PRIMARY CARE CLINIC | Facility: CLINIC | Age: 27
End: 2022-02-01
Payer: MEDICAID

## 2022-02-01 VITALS
HEART RATE: 100 BPM | WEIGHT: 190.25 LBS | TEMPERATURE: 98 F | DIASTOLIC BLOOD PRESSURE: 93 MMHG | HEIGHT: 68 IN | BODY MASS INDEX: 28.83 KG/M2 | SYSTOLIC BLOOD PRESSURE: 140 MMHG | OXYGEN SATURATION: 99 %

## 2022-02-01 DIAGNOSIS — R11.0 NAUSEA: ICD-10-CM

## 2022-02-01 DIAGNOSIS — R19.7 DIARRHEA, UNSPECIFIED TYPE: ICD-10-CM

## 2022-02-01 DIAGNOSIS — Z20.822 ENCOUNTER FOR LABORATORY TESTING FOR COVID-19 VIRUS: ICD-10-CM

## 2022-02-01 DIAGNOSIS — R19.7 DIARRHEA OF PRESUMED INFECTIOUS ORIGIN: ICD-10-CM

## 2022-02-01 DIAGNOSIS — A08.4 VIRAL ENTERITIS: Primary | ICD-10-CM

## 2022-02-01 DIAGNOSIS — R19.7 DIARRHEA: ICD-10-CM

## 2022-02-01 DIAGNOSIS — R05.9 COUGH: ICD-10-CM

## 2022-02-01 DIAGNOSIS — Z11.52 ENCOUNTER FOR SCREENING FOR COVID-19: ICD-10-CM

## 2022-02-01 PROBLEM — R11.10 VOMITING: Status: RESOLVED | Noted: 2021-12-08 | Resolved: 2022-02-01

## 2022-02-01 LAB
CTP QC/QA: YES
SARS-COV-2 AG RESP QL IA.RAPID: NEGATIVE

## 2022-02-01 PROCEDURE — 3008F PR BODY MASS INDEX (BMI) DOCUMENTED: ICD-10-PCS | Mod: CPTII,,, | Performed by: STUDENT IN AN ORGANIZED HEALTH CARE EDUCATION/TRAINING PROGRAM

## 2022-02-01 PROCEDURE — 87426 SARS CORONAVIRUS 2 ANTIGEN POCT: ICD-10-PCS | Mod: S$PBB,QW,, | Performed by: STUDENT IN AN ORGANIZED HEALTH CARE EDUCATION/TRAINING PROGRAM

## 2022-02-01 PROCEDURE — 1159F MED LIST DOCD IN RCRD: CPT | Mod: CPTII,,, | Performed by: STUDENT IN AN ORGANIZED HEALTH CARE EDUCATION/TRAINING PROGRAM

## 2022-02-01 PROCEDURE — 99999 PR PBB SHADOW E&M-EST. PATIENT-LVL III: ICD-10-PCS | Mod: PBBFAC,,, | Performed by: STUDENT IN AN ORGANIZED HEALTH CARE EDUCATION/TRAINING PROGRAM

## 2022-02-01 PROCEDURE — 99999 PR PBB SHADOW E&M-EST. PATIENT-LVL III: CPT | Mod: PBBFAC,,, | Performed by: STUDENT IN AN ORGANIZED HEALTH CARE EDUCATION/TRAINING PROGRAM

## 2022-02-01 PROCEDURE — 99213 OFFICE O/P EST LOW 20 MIN: CPT | Mod: S$PBB,,, | Performed by: STUDENT IN AN ORGANIZED HEALTH CARE EDUCATION/TRAINING PROGRAM

## 2022-02-01 PROCEDURE — 99213 OFFICE O/P EST LOW 20 MIN: CPT | Mod: PBBFAC,PN | Performed by: STUDENT IN AN ORGANIZED HEALTH CARE EDUCATION/TRAINING PROGRAM

## 2022-02-01 PROCEDURE — 1160F PR REVIEW ALL MEDS BY PRESCRIBER/CLIN PHARMACIST DOCUMENTED: ICD-10-PCS | Mod: CPTII,,, | Performed by: STUDENT IN AN ORGANIZED HEALTH CARE EDUCATION/TRAINING PROGRAM

## 2022-02-01 PROCEDURE — 3077F PR MOST RECENT SYSTOLIC BLOOD PRESSURE >= 140 MM HG: ICD-10-PCS | Mod: CPTII,,, | Performed by: STUDENT IN AN ORGANIZED HEALTH CARE EDUCATION/TRAINING PROGRAM

## 2022-02-01 PROCEDURE — 3077F SYST BP >= 140 MM HG: CPT | Mod: CPTII,,, | Performed by: STUDENT IN AN ORGANIZED HEALTH CARE EDUCATION/TRAINING PROGRAM

## 2022-02-01 PROCEDURE — 3080F PR MOST RECENT DIASTOLIC BLOOD PRESSURE >= 90 MM HG: ICD-10-PCS | Mod: CPTII,,, | Performed by: STUDENT IN AN ORGANIZED HEALTH CARE EDUCATION/TRAINING PROGRAM

## 2022-02-01 PROCEDURE — 3080F DIAST BP >= 90 MM HG: CPT | Mod: CPTII,,, | Performed by: STUDENT IN AN ORGANIZED HEALTH CARE EDUCATION/TRAINING PROGRAM

## 2022-02-01 PROCEDURE — 3008F BODY MASS INDEX DOCD: CPT | Mod: CPTII,,, | Performed by: STUDENT IN AN ORGANIZED HEALTH CARE EDUCATION/TRAINING PROGRAM

## 2022-02-01 PROCEDURE — 1159F PR MEDICATION LIST DOCUMENTED IN MEDICAL RECORD: ICD-10-PCS | Mod: CPTII,,, | Performed by: STUDENT IN AN ORGANIZED HEALTH CARE EDUCATION/TRAINING PROGRAM

## 2022-02-01 PROCEDURE — 87426 SARSCOV CORONAVIRUS AG IA: CPT | Mod: PBBFAC,PN | Performed by: STUDENT IN AN ORGANIZED HEALTH CARE EDUCATION/TRAINING PROGRAM

## 2022-02-01 PROCEDURE — 99213 PR OFFICE/OUTPT VISIT, EST, LEVL III, 20-29 MIN: ICD-10-PCS | Mod: S$PBB,,, | Performed by: STUDENT IN AN ORGANIZED HEALTH CARE EDUCATION/TRAINING PROGRAM

## 2022-02-01 PROCEDURE — 1160F RVW MEDS BY RX/DR IN RCRD: CPT | Mod: CPTII,,, | Performed by: STUDENT IN AN ORGANIZED HEALTH CARE EDUCATION/TRAINING PROGRAM

## 2022-02-01 NOTE — ASSESSMENT & PLAN NOTE
Acute onset started two days ago after eating fried fish. His girlfriend too had the same food and she developed similar symptoms. Initially watery has improved over the past days and this morning, diarrhea slowing forming back to paste like.   Rapid COVID19 negative. Symptoms overall improving, more likely viral gastroenteritis.   Will continue with supportive management. Maintain adequate daily hydration.   Resume diet slowly with bland food items.   - f/u one week with worsening symptoms

## 2022-02-01 NOTE — LETTER
February 1, 2022      [place] [id number] [facility], [address] [place] [id number]  Phone: [phone]  Fax: [phone]       Patient: Srinivas Huynh   YOB: 1995  Date of Visit: 02/01/2022    To Whom It May Concern:    Elena Huynh  was at [facility] on 02/01/2022. The patient was screened with rapid COVID19 and tested negative. He may return to work on 2/2/2022 with no restrictions. If you have any questions or concerns, or if I can be of further assistance, please do not hesitate to contact me.    Sincerely,      Shonda Weston, DO

## 2022-02-01 NOTE — PROGRESS NOTES
Subjective:       Patient ID: Srinivas Huynh is a 26 y.o. male.    Chief Complaint: Nausea and Diarrhea (No recent antibiotics use, patient attributes possible fried fish, but it was cooked thoroughly then thought combination of dairy and fish might result in it.  Diarrhea started Sunday afternoon after two hours of having the fish and continued to be watery and foul, dark, foul smelling. No blood or pus. )    26 year old male complains of two days of nausea and diarrhea after eating fried fish. His girlfriend similarly had the same food and developed similar symptoms with vomiting. Patient denies bloody diarrhea. Watery, dark and foul smelling at first. This morning's stool still loose, but more formed.   He has been trying to stay hydrated.   Denies fever, chills, headaches, excessive nausea, vomiting at this time.     Nausea  This is a new problem. Episode onset: two days ago. The problem occurs 2 to 4 times per day. The problem has been gradually improving. Associated symptoms include abdominal pain, anorexia, a change in bowel habit, chest pain, coughing, headaches and nausea. Pertinent negatives include no arthralgias, chills, congestion, diaphoresis, fatigue, fever, joint swelling, myalgias, neck pain, numbness, rash, sore throat, swollen glands, urinary symptoms, visual change, vomiting or weakness. The symptoms are aggravated by eating, drinking, smoking and coughing. He has tried sleep, rest and drinking for the symptoms. The treatment provided mild relief.   Diarrhea   Associated symptoms include abdominal pain, coughing and headaches. Pertinent negatives include no arthralgias, chills, fever, myalgias or vomiting.     Review of Systems   Constitutional: Negative for chills, diaphoresis, fatigue and fever.   HENT: Negative for nasal congestion and sore throat.    Respiratory: Positive for cough.    Cardiovascular: Positive for chest pain.   Gastrointestinal: Positive for abdominal pain, anorexia, change  in bowel habit, diarrhea, nausea and change in bowel habit. Negative for vomiting.   Musculoskeletal: Negative for arthralgias, joint swelling, myalgias and neck pain.   Integumentary:  Negative for rash.   Neurological: Positive for headaches. Negative for weakness and numbness.         Objective:      Physical Exam  Vitals and nursing note reviewed.   Constitutional:       General: He is not in acute distress.     Appearance: Normal appearance. He is normal weight. He is not toxic-appearing or diaphoretic.   HENT:      Head: Normocephalic and atraumatic.      Right Ear: External ear normal.      Left Ear: External ear normal.      Nose: Nose normal.      Mouth/Throat:      Mouth: Mucous membranes are moist.      Pharynx: Oropharynx is clear.   Eyes:      Extraocular Movements: Extraocular movements intact.      Conjunctiva/sclera: Conjunctivae normal.   Cardiovascular:      Rate and Rhythm: Normal rate and regular rhythm.      Pulses: Normal pulses.      Heart sounds: Normal heart sounds. No murmur heard.      Pulmonary:      Effort: Pulmonary effort is normal. No respiratory distress.      Breath sounds: Normal breath sounds. No wheezing, rhonchi or rales.   Chest:      Chest wall: No tenderness.   Abdominal:      General: Abdomen is flat. Bowel sounds are normal. There is no distension.      Palpations: Abdomen is soft. There is no mass.      Tenderness: There is no abdominal tenderness. There is no right CVA tenderness, left CVA tenderness or guarding.   Musculoskeletal:         General: Normal range of motion.      Right hand: Normal strength.      Cervical back: Normal range of motion and neck supple. No rigidity or tenderness.      Right lower leg: No edema.      Left lower leg: No edema.   Lymphadenopathy:      Cervical: No cervical adenopathy.   Skin:     General: Skin is warm and dry.      Capillary Refill: Capillary refill takes less than 2 seconds.      Coloration: Skin is not jaundiced or pale.    Neurological:      General: No focal deficit present.      Mental Status: He is alert and oriented to person, place, and time.      Motor: No weakness.      Gait: Gait normal.   Psychiatric:         Mood and Affect: Mood normal.         Behavior: Behavior normal.         Thought Content: Thought content normal.         Judgment: Judgment normal.         Assessment and Plan:       Problem List Items Addressed This Visit        Pulmonary    Cough       ID    Diarrhea of presumed infectious origin     Acute onset started two days ago after eating fried fish. His girlfriend too had the same food and she developed similar symptoms. Initially watery has improved over the past days and this morning, diarrhea slowing forming back to paste like.   Rapid COVID19 negative. Symptoms overall improving, more likely viral gastroenteritis.   Will continue with supportive management. Maintain adequate daily hydration.   Resume diet slowly with bland food items.   - f/u one week with worsening symptoms         Viral enteritis - Primary       Other    Encounter for screening for COVID-19    Relevant Orders    SARS Coronavirus 2 Antigen, POCT (Completed)      Other Visit Diagnoses     Nausea        Encounter for laboratory testing for COVID-19 virus

## 2022-02-15 ENCOUNTER — APPOINTMENT (OUTPATIENT)
Dept: PRIMARY CARE CLINIC | Facility: CLINIC | Age: 27
End: 2022-02-15
Payer: MEDICAID

## 2022-02-15 ENCOUNTER — OFFICE VISIT (OUTPATIENT)
Dept: PRIMARY CARE CLINIC | Facility: CLINIC | Age: 27
End: 2022-02-15
Payer: MEDICAID

## 2022-02-15 VITALS
HEART RATE: 78 BPM | DIASTOLIC BLOOD PRESSURE: 75 MMHG | SYSTOLIC BLOOD PRESSURE: 136 MMHG | HEIGHT: 68 IN | OXYGEN SATURATION: 98 % | TEMPERATURE: 98 F | BODY MASS INDEX: 31.07 KG/M2 | WEIGHT: 205 LBS

## 2022-02-15 DIAGNOSIS — Z11.52 ENCOUNTER FOR SCREENING FOR COVID-19: Primary | ICD-10-CM

## 2022-02-15 DIAGNOSIS — J30.89 ENVIRONMENTAL AND SEASONAL ALLERGIES: ICD-10-CM

## 2022-02-15 PROBLEM — M62.830 SPASM OF MUSCLE OF LOWER BACK: Status: RESOLVED | Noted: 2022-01-04 | Resolved: 2022-02-15

## 2022-02-15 PROBLEM — R50.9 FEVER: Status: RESOLVED | Noted: 2021-12-01 | Resolved: 2022-02-15

## 2022-02-15 PROBLEM — R06.2 WHEEZING: Status: RESOLVED | Noted: 2021-11-29 | Resolved: 2022-02-15

## 2022-02-15 PROBLEM — R19.7 DIARRHEA OF PRESUMED INFECTIOUS ORIGIN: Status: RESOLVED | Noted: 2021-12-01 | Resolved: 2022-02-15

## 2022-02-15 LAB
CTP QC/QA: YES
SARS-COV-2 AG RESP QL IA.RAPID: NEGATIVE

## 2022-02-15 PROCEDURE — 3078F PR MOST RECENT DIASTOLIC BLOOD PRESSURE < 80 MM HG: ICD-10-PCS | Mod: CPTII,,, | Performed by: STUDENT IN AN ORGANIZED HEALTH CARE EDUCATION/TRAINING PROGRAM

## 2022-02-15 PROCEDURE — 99213 PR OFFICE/OUTPT VISIT, EST, LEVL III, 20-29 MIN: ICD-10-PCS | Mod: S$PBB,,, | Performed by: STUDENT IN AN ORGANIZED HEALTH CARE EDUCATION/TRAINING PROGRAM

## 2022-02-15 PROCEDURE — 99213 OFFICE O/P EST LOW 20 MIN: CPT | Mod: S$PBB,,, | Performed by: STUDENT IN AN ORGANIZED HEALTH CARE EDUCATION/TRAINING PROGRAM

## 2022-02-15 PROCEDURE — 87426 SARSCOV CORONAVIRUS AG IA: CPT | Mod: PBBFAC,PN

## 2022-02-15 PROCEDURE — 3008F PR BODY MASS INDEX (BMI) DOCUMENTED: ICD-10-PCS | Mod: CPTII,,, | Performed by: STUDENT IN AN ORGANIZED HEALTH CARE EDUCATION/TRAINING PROGRAM

## 2022-02-15 PROCEDURE — 3078F DIAST BP <80 MM HG: CPT | Mod: CPTII,,, | Performed by: STUDENT IN AN ORGANIZED HEALTH CARE EDUCATION/TRAINING PROGRAM

## 2022-02-15 PROCEDURE — 99999 PR PBB SHADOW E&M-EST. PATIENT-LVL III: CPT | Mod: PBBFAC,,, | Performed by: STUDENT IN AN ORGANIZED HEALTH CARE EDUCATION/TRAINING PROGRAM

## 2022-02-15 PROCEDURE — 3075F SYST BP GE 130 - 139MM HG: CPT | Mod: CPTII,,, | Performed by: STUDENT IN AN ORGANIZED HEALTH CARE EDUCATION/TRAINING PROGRAM

## 2022-02-15 PROCEDURE — 87426 SARSCOV CORONAVIRUS AG IA: CPT | Mod: S$PBB,QW,, | Performed by: STUDENT IN AN ORGANIZED HEALTH CARE EDUCATION/TRAINING PROGRAM

## 2022-02-15 PROCEDURE — 3008F BODY MASS INDEX DOCD: CPT | Mod: CPTII,,, | Performed by: STUDENT IN AN ORGANIZED HEALTH CARE EDUCATION/TRAINING PROGRAM

## 2022-02-15 PROCEDURE — 99213 OFFICE O/P EST LOW 20 MIN: CPT | Mod: PBBFAC,PN | Performed by: STUDENT IN AN ORGANIZED HEALTH CARE EDUCATION/TRAINING PROGRAM

## 2022-02-15 PROCEDURE — 1159F MED LIST DOCD IN RCRD: CPT | Mod: CPTII,,, | Performed by: STUDENT IN AN ORGANIZED HEALTH CARE EDUCATION/TRAINING PROGRAM

## 2022-02-15 PROCEDURE — 3075F PR MOST RECENT SYSTOLIC BLOOD PRESS GE 130-139MM HG: ICD-10-PCS | Mod: CPTII,,, | Performed by: STUDENT IN AN ORGANIZED HEALTH CARE EDUCATION/TRAINING PROGRAM

## 2022-02-15 PROCEDURE — 99999 PR PBB SHADOW E&M-EST. PATIENT-LVL III: ICD-10-PCS | Mod: PBBFAC,,, | Performed by: STUDENT IN AN ORGANIZED HEALTH CARE EDUCATION/TRAINING PROGRAM

## 2022-02-15 PROCEDURE — 1159F PR MEDICATION LIST DOCUMENTED IN MEDICAL RECORD: ICD-10-PCS | Mod: CPTII,,, | Performed by: STUDENT IN AN ORGANIZED HEALTH CARE EDUCATION/TRAINING PROGRAM

## 2022-02-15 PROCEDURE — 87426 SARS CORONAVIRUS 2 ANTIGEN POCT: ICD-10-PCS | Mod: S$PBB,QW,, | Performed by: STUDENT IN AN ORGANIZED HEALTH CARE EDUCATION/TRAINING PROGRAM

## 2022-02-15 RX ORDER — CETIRIZINE HYDROCHLORIDE 10 MG/1
10 TABLET ORAL DAILY
Qty: 90 TABLET | Refills: 3 | Status: SHIPPED | OUTPATIENT
Start: 2022-02-15 | End: 2022-05-03 | Stop reason: SDUPTHER

## 2022-02-15 NOTE — LETTER
February 15, 2022      [place] [id number] [facility], [address] [place] [id number]  Phone: [phone]  Fax: [phone]       Patient: Srinivas Huynh   YOB: 1995  Date of Visit: 02/15/2022    To Whom It May Concern:    Elena Huynh  was at [facility] on 02/15/2022. The patient may return to work on 2/16/22 with no restrictions. If you have any questions or concerns, or if I can be of further assistance, please do not hesitate to contact me.    Sincerely,        Shonda Weston, DO

## 2022-02-15 NOTE — PROGRESS NOTES
Subjective:       Patient ID: Srinivas Huynh is a 26 y.o. male.    Chief Complaint: Sore Throat    Has had recent exposure this past weekend with COVID19 positive person. Since started to experience sore throat of two days. Denies fever, chills, severe headaches, body aches, chest pain, palpitations, shortness of breath, abdominal pain, nausea, vomiting.     Review of Systems   HENT: Positive for sore throat.    Respiratory: Positive for cough (intermittent).    All other systems reviewed and are negative.        Objective:      Physical Exam  Vitals and nursing note reviewed.   Constitutional:       General: He is not in acute distress.     Appearance: Normal appearance. He is normal weight. He is not toxic-appearing or diaphoretic.   HENT:      Head: Normocephalic and atraumatic.      Right Ear: External ear normal.      Left Ear: External ear normal.      Nose: Nose normal.      Mouth/Throat:      Mouth: Mucous membranes are moist.      Pharynx: Oropharynx is clear.   Eyes:      Extraocular Movements: Extraocular movements intact.      Conjunctiva/sclera: Conjunctivae normal.   Cardiovascular:      Rate and Rhythm: Normal rate and regular rhythm.      Pulses: Normal pulses.      Heart sounds: Normal heart sounds. No murmur heard.       Comments: + radial pulse bilaterally  Pulmonary:      Effort: Pulmonary effort is normal. No respiratory distress.      Breath sounds: No wheezing, rhonchi or rales.   Chest:      Chest wall: No tenderness.   Abdominal:      General: Abdomen is flat. Bowel sounds are normal.      Palpations: Abdomen is soft. There is no mass.      Tenderness: There is no right CVA tenderness or left CVA tenderness.   Musculoskeletal:      Right hand: Normal strength.      Cervical back: Normal range of motion and neck supple. No rigidity or tenderness.      Right lower leg: No edema.      Left lower leg: No edema.   Lymphadenopathy:      Cervical: No cervical adenopathy.   Skin:     General: Skin  is warm and dry.   Neurological:      General: No focal deficit present.      Mental Status: He is alert and oriented to person, place, and time.      Motor: No weakness.      Gait: Gait normal.   Psychiatric:         Mood and Affect: Mood normal.         Behavior: Behavior normal.         Thought Content: Thought content normal.         Judgment: Judgment normal.         Assessment and Plan:       Problem List Items Addressed This Visit        Endocrine    BMI 26.0-26.9,adult     Today's BMI increase to >31. There is weight gain, unintentional likely contributed by imbalance in diet and reduced physical activity.   - stay active  - maintain daily adequate hydration 1.5 to 2L fluid volume  - as tolerated incorporate resistance training with stretching and cardio  - goal of 150 minutes per week of moderate intensity activity or 7,500 - 10,000 steps per day  - follow the Mediterranean diet  - include whole fresh fruits, vegetables, olive oil, seeds, nuts, whole grains, cold water fish, salmon, mackerel and lean cuts of meat    - do not drink sugary/diet carbonated beverages  - decrease portion sizes slightly which will result in an approximately 500-calorie deficit  - avoid fast or fried and processed food, especially canned foods   - avoid refined carbohydrates, white starchy foods, flour, white potato, bread, muffins, and cakes              Other    Encounter for screening for COVID-19 - Primary     POC COVID19 negative. Symptoms might be manifested from PND with developing allergy symptoms. Will prescribe Zyrtec and monitor symptom improvement.   - f/u one week          Environmental and seasonal allergies    Relevant Medications    cetirizine (ZYRTEC) 10 MG tablet

## 2022-02-16 PROBLEM — J30.89 ENVIRONMENTAL AND SEASONAL ALLERGIES: Status: ACTIVE | Noted: 2022-02-16

## 2022-02-16 NOTE — ASSESSMENT & PLAN NOTE
Today's BMI increase to >31. There is weight gain, unintentional likely contributed by imbalance in diet and reduced physical activity.   - stay active  - maintain daily adequate hydration 1.5 to 2L fluid volume  - as tolerated incorporate resistance training with stretching and cardio  - goal of 150 minutes per week of moderate intensity activity or 7,500 - 10,000 steps per day  - follow the Mediterranean diet  - include whole fresh fruits, vegetables, olive oil, seeds, nuts, whole grains, cold water fish, salmon, mackerel and lean cuts of meat    - do not drink sugary/diet carbonated beverages  - decrease portion sizes slightly which will result in an approximately 500-calorie deficit  - avoid fast or fried and processed food, especially canned foods   - avoid refined carbohydrates, white starchy foods, flour, white potato, bread, muffins, and cakes

## 2022-02-16 NOTE — ASSESSMENT & PLAN NOTE
POC COVID19 negative. Symptoms might be manifested from PND with developing allergy symptoms. Will prescribe Zyrtec and monitor symptom improvement.   - f/u one week

## 2022-04-06 ENCOUNTER — OFFICE VISIT (OUTPATIENT)
Dept: PRIMARY CARE CLINIC | Facility: CLINIC | Age: 27
End: 2022-04-06
Payer: MEDICAID

## 2022-04-06 VITALS
DIASTOLIC BLOOD PRESSURE: 72 MMHG | BODY MASS INDEX: 27.65 KG/M2 | WEIGHT: 181.88 LBS | OXYGEN SATURATION: 96 % | SYSTOLIC BLOOD PRESSURE: 125 MMHG | HEART RATE: 85 BPM | TEMPERATURE: 98 F

## 2022-04-06 DIAGNOSIS — Z88.9 SEVERE DERMATITIS, MULTIPLE ALLERGIES, AND METABOLIC WASTING SYNDROME: Primary | ICD-10-CM

## 2022-04-06 DIAGNOSIS — L30.9 SEVERE DERMATITIS, MULTIPLE ALLERGIES, AND METABOLIC WASTING SYNDROME: Primary | ICD-10-CM

## 2022-04-06 DIAGNOSIS — Q87.89 SEVERE DERMATITIS, MULTIPLE ALLERGIES, AND METABOLIC WASTING SYNDROME: Primary | ICD-10-CM

## 2022-04-06 DIAGNOSIS — J30.89 ENVIRONMENTAL AND SEASONAL ALLERGIES: ICD-10-CM

## 2022-04-06 DIAGNOSIS — E88.A SEVERE DERMATITIS, MULTIPLE ALLERGIES, AND METABOLIC WASTING SYNDROME: Primary | ICD-10-CM

## 2022-04-06 PROCEDURE — 3074F SYST BP LT 130 MM HG: CPT | Mod: CPTII,,, | Performed by: STUDENT IN AN ORGANIZED HEALTH CARE EDUCATION/TRAINING PROGRAM

## 2022-04-06 PROCEDURE — 99213 OFFICE O/P EST LOW 20 MIN: CPT | Mod: PBBFAC,PN | Performed by: STUDENT IN AN ORGANIZED HEALTH CARE EDUCATION/TRAINING PROGRAM

## 2022-04-06 PROCEDURE — 3008F BODY MASS INDEX DOCD: CPT | Mod: CPTII,,, | Performed by: STUDENT IN AN ORGANIZED HEALTH CARE EDUCATION/TRAINING PROGRAM

## 2022-04-06 PROCEDURE — 3078F DIAST BP <80 MM HG: CPT | Mod: CPTII,,, | Performed by: STUDENT IN AN ORGANIZED HEALTH CARE EDUCATION/TRAINING PROGRAM

## 2022-04-06 PROCEDURE — 99213 PR OFFICE/OUTPT VISIT, EST, LEVL III, 20-29 MIN: ICD-10-PCS | Mod: S$PBB,,, | Performed by: STUDENT IN AN ORGANIZED HEALTH CARE EDUCATION/TRAINING PROGRAM

## 2022-04-06 PROCEDURE — 3078F PR MOST RECENT DIASTOLIC BLOOD PRESSURE < 80 MM HG: ICD-10-PCS | Mod: CPTII,,, | Performed by: STUDENT IN AN ORGANIZED HEALTH CARE EDUCATION/TRAINING PROGRAM

## 2022-04-06 PROCEDURE — 99213 OFFICE O/P EST LOW 20 MIN: CPT | Mod: S$PBB,,, | Performed by: STUDENT IN AN ORGANIZED HEALTH CARE EDUCATION/TRAINING PROGRAM

## 2022-04-06 PROCEDURE — 99999 PR PBB SHADOW E&M-EST. PATIENT-LVL III: CPT | Mod: PBBFAC,,, | Performed by: STUDENT IN AN ORGANIZED HEALTH CARE EDUCATION/TRAINING PROGRAM

## 2022-04-06 PROCEDURE — 3074F PR MOST RECENT SYSTOLIC BLOOD PRESSURE < 130 MM HG: ICD-10-PCS | Mod: CPTII,,, | Performed by: STUDENT IN AN ORGANIZED HEALTH CARE EDUCATION/TRAINING PROGRAM

## 2022-04-06 PROCEDURE — 1159F PR MEDICATION LIST DOCUMENTED IN MEDICAL RECORD: ICD-10-PCS | Mod: CPTII,,, | Performed by: STUDENT IN AN ORGANIZED HEALTH CARE EDUCATION/TRAINING PROGRAM

## 2022-04-06 PROCEDURE — 99999 PR PBB SHADOW E&M-EST. PATIENT-LVL III: ICD-10-PCS | Mod: PBBFAC,,, | Performed by: STUDENT IN AN ORGANIZED HEALTH CARE EDUCATION/TRAINING PROGRAM

## 2022-04-06 PROCEDURE — 3008F PR BODY MASS INDEX (BMI) DOCUMENTED: ICD-10-PCS | Mod: CPTII,,, | Performed by: STUDENT IN AN ORGANIZED HEALTH CARE EDUCATION/TRAINING PROGRAM

## 2022-04-06 PROCEDURE — 1159F MED LIST DOCD IN RCRD: CPT | Mod: CPTII,,, | Performed by: STUDENT IN AN ORGANIZED HEALTH CARE EDUCATION/TRAINING PROGRAM

## 2022-04-06 RX ORDER — PREDNISONE 20 MG/1
40 TABLET ORAL DAILY
Qty: 8 TABLET | Refills: 0 | Status: SHIPPED | OUTPATIENT
Start: 2022-04-06 | End: 2022-04-10

## 2022-04-06 RX ORDER — HYDROCORTISONE AND ACETIC ACID 20.75; 10.375 MG/ML; MG/ML
3 SOLUTION AURICULAR (OTIC) 2 TIMES DAILY
Qty: 10 ML | Refills: 0 | Status: SHIPPED | OUTPATIENT
Start: 2022-04-06 | End: 2022-04-16

## 2022-04-09 NOTE — ASSESSMENT & PLAN NOTE
Significant swelling over turbinates bilaterally, PND, flaky ear canals with mild erythema, TM severely scarred from old tubes. Persistant couh and drainage of increased clear sputum production. Breath sounds reaching lung bases bilaterally, no wheezing.   - when outdoos, especially at work consider wearing protective wear including facial mask to minimize inhaling pollutants  - smoking cessation encouraged, patient agrees  - will provide short prednisone therapy  - otic drop for ear itching and flaky skin  - f/u in one week or sooner with worsening symptoms

## 2022-04-09 NOTE — PROGRESS NOTES
Subjective:       Patient ID: Srinivas Huynh is a 26 y.o. male.    Chief Complaint: Cough, Nasal Congestion, Sore Throat, Headache, Dizziness, Sinus Problem, Eye Drainage (Patient has been having eye swollen and covered in green mucous, cough, fluid in ears, congestion, sore throat for about a week), and Ear Fullness    Cough  This is a new problem. The current episode started in the past 7 days (worsened over the past 4 days especially working outdoors). The problem has been rapidly worsening. The problem occurs constantly. The cough is productive of sputum. Associated symptoms include ear congestion, headaches, nasal congestion and postnasal drip. Pertinent negatives include no chest pain, chills, ear pain, fever, heartburn, hemoptysis, myalgias, rash, sore throat, shortness of breath, sweats, weight loss or wheezing. The symptoms are aggravated by dust, pollens and fumes. Risk factors for lung disease include occupational exposure. He has tried nothing for the symptoms. His past medical history is significant for asthma, bronchitis and environmental allergies. There is no history of bronchiectasis, COPD, emphysema or pneumonia.   Sinus Problem  There has been no fever. Associated symptoms include congestion, coughing, headaches and sinus pressure. Pertinent negatives include no chills, diaphoresis, ear pain, neck pain, shortness of breath, sore throat or swollen glands. Past treatments include nothing.   Ear Fullness   There is pain in both ears. This is a new problem. The problem has been rapidly worsening. Associated symptoms include coughing and headaches. Pertinent negatives include no diarrhea, ear discharge, neck pain, rash, sore throat or vomiting. His past medical history is significant for a chronic ear infection and a tympanostomy tube.     Review of Systems   Constitutional: Negative for chills, diaphoresis, fever and weight loss.   HENT: Positive for nasal congestion, postnasal drip and sinus  pressure/congestion. Negative for ear discharge, ear pain and sore throat.    Respiratory: Positive for cough. Negative for hemoptysis, shortness of breath and wheezing.    Cardiovascular: Negative for chest pain.   Gastrointestinal: Negative for diarrhea, heartburn and vomiting.   Musculoskeletal: Negative for myalgias and neck pain.   Integumentary:  Negative for rash.   Allergic/Immunologic: Positive for environmental allergies.   Neurological: Positive for headaches.         Objective:      Physical Exam  Vitals reviewed.   Constitutional:       General: He is in acute distress (mild).      Appearance: Normal appearance. He is normal weight. He is ill-appearing. He is not toxic-appearing.   HENT:      Head: Normocephalic.      Ears:      Comments: B/l TM severe scarring from past tubes, no drainage, canal with flaky skin     Nose: Congestion present.      Comments: Hyperemia in both nares, swollen turbinates     Mouth/Throat:      Pharynx: Posterior oropharyngeal erythema (mild, no swelling) present. No oropharyngeal exudate.   Eyes:      Comments: Teary clear discharge from eye bilaterally   Cardiovascular:      Rate and Rhythm: Normal rate and regular rhythm.      Pulses: Normal pulses.   Pulmonary:      Effort: Pulmonary effort is normal. No respiratory distress.      Breath sounds: No stridor. No wheezing.   Abdominal:      Palpations: Abdomen is soft.   Musculoskeletal:      Cervical back: Normal range of motion and neck supple. No tenderness.   Skin:     General: Skin is warm and dry.   Neurological:      General: No focal deficit present.      Mental Status: He is alert and oriented to person, place, and time. Mental status is at baseline.         Assessment and Plan:       Problem List Items Addressed This Visit        Other    Environmental and seasonal allergies     Significant swelling over turbinates bilaterally, PND, flaky ear canals with mild erythema, TM severely scarred from old tubes. Persistant  couh and drainage of increased clear sputum production. Breath sounds reaching lung bases bilaterally, no wheezing.   - when outdoos, especially at work consider wearing protective wear including facial mask to minimize inhaling pollutants  - smoking cessation encouraged, patient agrees  - will provide short prednisone therapy  - otic drop for ear itching and flaky skin  - f/u in one week or sooner with worsening symptoms             Other Visit Diagnoses     Severe dermatitis, multiple allergies, and metabolic wasting syndrome    -  Primary    Relevant Medications    predniSONE (DELTASONE) 20 MG tablet    acetic acid-hydrocortisone (VOSOL-HC) otic solution

## 2022-05-03 ENCOUNTER — OFFICE VISIT (OUTPATIENT)
Dept: PRIMARY CARE CLINIC | Facility: CLINIC | Age: 27
End: 2022-05-03
Payer: MEDICAID

## 2022-05-03 VITALS
TEMPERATURE: 98 F | HEIGHT: 68 IN | BODY MASS INDEX: 27.93 KG/M2 | HEART RATE: 79 BPM | WEIGHT: 184.31 LBS | OXYGEN SATURATION: 98 % | SYSTOLIC BLOOD PRESSURE: 127 MMHG | DIASTOLIC BLOOD PRESSURE: 76 MMHG

## 2022-05-03 DIAGNOSIS — K52.9 GASTROENTERITIS: Primary | ICD-10-CM

## 2022-05-03 DIAGNOSIS — J30.89 ENVIRONMENTAL AND SEASONAL ALLERGIES: ICD-10-CM

## 2022-05-03 PROCEDURE — 3008F PR BODY MASS INDEX (BMI) DOCUMENTED: ICD-10-PCS | Mod: CPTII,,, | Performed by: STUDENT IN AN ORGANIZED HEALTH CARE EDUCATION/TRAINING PROGRAM

## 2022-05-03 PROCEDURE — 1159F MED LIST DOCD IN RCRD: CPT | Mod: CPTII,,, | Performed by: STUDENT IN AN ORGANIZED HEALTH CARE EDUCATION/TRAINING PROGRAM

## 2022-05-03 PROCEDURE — 99213 PR OFFICE/OUTPT VISIT, EST, LEVL III, 20-29 MIN: ICD-10-PCS | Mod: S$PBB,,, | Performed by: STUDENT IN AN ORGANIZED HEALTH CARE EDUCATION/TRAINING PROGRAM

## 2022-05-03 PROCEDURE — 3078F PR MOST RECENT DIASTOLIC BLOOD PRESSURE < 80 MM HG: ICD-10-PCS | Mod: CPTII,,, | Performed by: STUDENT IN AN ORGANIZED HEALTH CARE EDUCATION/TRAINING PROGRAM

## 2022-05-03 PROCEDURE — 3078F DIAST BP <80 MM HG: CPT | Mod: CPTII,,, | Performed by: STUDENT IN AN ORGANIZED HEALTH CARE EDUCATION/TRAINING PROGRAM

## 2022-05-03 PROCEDURE — 99999 PR PBB SHADOW E&M-EST. PATIENT-LVL III: ICD-10-PCS | Mod: PBBFAC,,, | Performed by: STUDENT IN AN ORGANIZED HEALTH CARE EDUCATION/TRAINING PROGRAM

## 2022-05-03 PROCEDURE — 99213 OFFICE O/P EST LOW 20 MIN: CPT | Mod: S$PBB,,, | Performed by: STUDENT IN AN ORGANIZED HEALTH CARE EDUCATION/TRAINING PROGRAM

## 2022-05-03 PROCEDURE — 99213 OFFICE O/P EST LOW 20 MIN: CPT | Mod: PBBFAC,PN | Performed by: STUDENT IN AN ORGANIZED HEALTH CARE EDUCATION/TRAINING PROGRAM

## 2022-05-03 PROCEDURE — 3074F PR MOST RECENT SYSTOLIC BLOOD PRESSURE < 130 MM HG: ICD-10-PCS | Mod: CPTII,,, | Performed by: STUDENT IN AN ORGANIZED HEALTH CARE EDUCATION/TRAINING PROGRAM

## 2022-05-03 PROCEDURE — 3008F BODY MASS INDEX DOCD: CPT | Mod: CPTII,,, | Performed by: STUDENT IN AN ORGANIZED HEALTH CARE EDUCATION/TRAINING PROGRAM

## 2022-05-03 PROCEDURE — 1159F PR MEDICATION LIST DOCUMENTED IN MEDICAL RECORD: ICD-10-PCS | Mod: CPTII,,, | Performed by: STUDENT IN AN ORGANIZED HEALTH CARE EDUCATION/TRAINING PROGRAM

## 2022-05-03 PROCEDURE — 99999 PR PBB SHADOW E&M-EST. PATIENT-LVL III: CPT | Mod: PBBFAC,,, | Performed by: STUDENT IN AN ORGANIZED HEALTH CARE EDUCATION/TRAINING PROGRAM

## 2022-05-03 PROCEDURE — 3074F SYST BP LT 130 MM HG: CPT | Mod: CPTII,,, | Performed by: STUDENT IN AN ORGANIZED HEALTH CARE EDUCATION/TRAINING PROGRAM

## 2022-05-03 RX ORDER — CETIRIZINE HYDROCHLORIDE 10 MG/1
10 TABLET ORAL DAILY
Qty: 30 TABLET | Refills: 2 | Status: SHIPPED | OUTPATIENT
Start: 2022-05-03 | End: 2022-11-17 | Stop reason: SDUPTHER

## 2022-05-03 NOTE — ASSESSMENT & PLAN NOTE
Wt Readings from Last 3 Encounters:   05/03/22 1028 83.6 kg (184 lb 4.9 oz)   04/06/22 1013 82.5 kg (181 lb 14.1 oz)   02/15/22 1037 93 kg (205 lb)     BMI increase to 28. There is weight gain, unintentional likely contributed by imbalance in diet and reduced physical activity.   - stay physically active  - maintain daily adequate hydration 1.5 to 2L fluid volume  - as tolerated incorporate resistance training with stretching and cardio  - goal of 150 minutes per week of moderate intensity activity or 7,500 - 10,000 steps per day  - follow the Mediterranean diet  - include whole fresh fruits, vegetables, olive oil, seeds, nuts, whole grains, cold water fish, salmon, mackerel and lean cuts of meat    - do not drink sugary/diet carbonated beverages  - decrease portion sizes slightly which will result in an approximately 500-calorie deficit  - avoid fast or fried and processed food, especially canned foods   - avoid refined carbohydrates, white starchy foods, flour, white potato, bread, muffins, and cakes

## 2022-05-03 NOTE — PROGRESS NOTES
[facility] Note    Chief Complaint      Chief Complaint   Patient presents with    Diarrhea     Cramps started Sunday afternoon,  then the diarrhea started Monday morning,  patient also states he did vomit once.  He states he probably went to the restroom 10 times. He has ricardo twice today and the cramping is gone.        History of Present Illness      Srinivas Huynh is a 26 y.o. male who presents today for Diarrhea (Cramps started Sunday afternoon,  then the diarrhea started Monday morning,  patient also states he did vomit once.  He states he probably went to the restroom 10 times. He has ricardo twice today and the cramping is gone. )     Patient states he was in contact with a one year old sick infant with diarrhea for few days prior to his symptoms starting on late Sunday evening. After two days of watery, green brown colored stools have reduced in number and patient has able to tolerate bland soups. Denies any blood in stools. Emesis x2 early in the day, non bloody, non-bilious. Abdominal cramping has subsided. Denies fever, chills, headaches, nausea, vomiting.  No diarrhea today.     Given overall symptom improvement, likely acute gastroenteritis viral in origin. Encouraged continued hydration and resume diet with bland food and advancing as tolerated.     Past Medical History:  Past Medical History:   Diagnosis Date    Bronchitis     Diarrhea of presumed infectious origin 12/1/2021    Fever 12/1/2021    Fracture of first metacarpal     High cholesterol     High triglycerides     HLD (hyperlipidemia)     Left hand fracture     Marijuana use 1/9/2019    Muscle spasms of neck 11/1/2021    Obesity 1/9/2019    Smoker 1/9/2019    Spasm of muscle of lower back 1/4/2022    Vomiting 12/8/2021    Wheezing 11/29/2021       Past Surgical History:   has a past surgical history that includes sinus sx; Myringotomy w/ tubes; Adenoidectomy; and Closed reduction of fracture of hand with  percutaneous pinning (Left, 1/10/2019).    Family History:  family history is not on file.     Social History:  Social History     Tobacco Use    Smoking status: Current Every Day Smoker     Packs/day: 1.00     Years: 12.00     Pack years: 12.00     Types: Cigarettes    Smokeless tobacco: Never Used   Substance Use Topics    Alcohol use: No    Drug use: Not Currently     Types: Marijuana     Comment: 2 joints daily, 2 g daily per patient       I personally reviewed all past medical, surgical, social and family history.    Review of Systems   Constitutional: Negative for chills, diaphoresis, fever, malaise/fatigue and weight loss.   HENT: Negative for congestion, ear discharge, ear pain, hearing loss, nosebleeds, sinus pain, sore throat and tinnitus.    Eyes: Negative for blurred vision, double vision, photophobia, pain, discharge and redness.   Respiratory: Positive for cough. Negative for hemoptysis, sputum production, shortness of breath and wheezing.    Cardiovascular: Negative for chest pain, palpitations, leg swelling and PND.   Gastrointestinal: Positive for diarrhea (past two days, watery) and vomiting. Negative for abdominal pain, blood in stool, constipation, melena and nausea.   Genitourinary: Negative.  Negative for flank pain.   Musculoskeletal: Positive for myalgias. Negative for back pain, falls, joint pain and neck pain.   Skin: Negative for itching and rash.   Neurological: Negative for dizziness, tingling, tremors, sensory change, speech change, focal weakness, seizures, weakness and headaches.   Psychiatric/Behavioral: Negative.       Medications:  Outpatient Encounter Medications as of 5/3/2022   Medication Sig Dispense Refill    acetaminophen (TYLENOL) 650 MG TbSR Take 650 mg by mouth every 8 (eight) hours. Tylenol arthritis 8hr Extended release      cetirizine (ZYRTEC) 10 MG tablet Take 1 tablet (10 mg total) by mouth once daily. 30 tablet 2    [DISCONTINUED] cetirizine (ZYRTEC) 10 MG  "tablet Take 1 tablet (10 mg total) by mouth once daily. (Patient not taking: No sig reported) 90 tablet 3     No facility-administered encounter medications on file as of 5/3/2022.     Allergies:  Review of patient's allergies indicates:  No Known Allergies    Health Maintenance:  Immunization History   Administered Date(s) Administered    COVID-19, MRNA, LN-S, PF (MODERNA FULL 0.5 ML DOSE) 09/14/2021    Influenza - Quadrivalent - PF *Preferred* (6 months and older) 11/29/2021      Health Maintenance   Topic Date Due    TETANUS VACCINE  11/01/2022 (Originally 5/21/2015)    HPV Vaccines (1 - Male 2-dose series) 11/01/2022 (Originally 5/21/2008)    Hepatitis C Screening  Completed    Lipid Panel  Completed        Physical Exam      Vital Signs  Temp: 98.3 °F (36.8 °C)  Temp src: Oral  Pulse: 79  SpO2: 98 %  BP: 127/76  BP Location: Right arm  Patient Position: Sitting  Pain Score: 0-No pain  Height and Weight  Height: 5' 8" (172.7 cm)  Weight: 83.6 kg (184 lb 4.9 oz)  BSA (Calculated - sq m): 2 sq meters  BMI (Calculated): 28  Weight in (lb) to have BMI = 25: 164.1]    Physical Exam  Vitals and nursing note reviewed.   Constitutional:       General: He is not in acute distress.     Appearance: Normal appearance. He is normal weight. He is not toxic-appearing or diaphoretic.   HENT:      Head: Normocephalic and atraumatic.      Right Ear: External ear normal.      Left Ear: External ear normal.      Nose: Nose normal.      Mouth/Throat:      Mouth: Mucous membranes are moist.      Pharynx: Oropharynx is clear.   Eyes:      Extraocular Movements: Extraocular movements intact.      Conjunctiva/sclera: Conjunctivae normal.   Cardiovascular:      Rate and Rhythm: Normal rate and regular rhythm.      Pulses: Normal pulses.      Heart sounds: Normal heart sounds. No murmur heard.  Pulmonary:      Effort: Pulmonary effort is normal. No respiratory distress.      Breath sounds: Normal breath sounds. No wheezing, rhonchi " or rales.   Chest:      Chest wall: No tenderness.   Abdominal:      General: Abdomen is flat. Bowel sounds are normal. There is no distension.      Palpations: Abdomen is soft. There is no mass.      Tenderness: There is no abdominal tenderness. There is no right CVA tenderness, left CVA tenderness or guarding.   Musculoskeletal:         General: Normal range of motion.      Right hand: Normal strength.      Cervical back: Normal range of motion and neck supple. No rigidity or tenderness.      Right lower leg: No edema.      Left lower leg: No edema.   Lymphadenopathy:      Cervical: No cervical adenopathy.   Skin:     General: Skin is warm and dry.      Capillary Refill: Capillary refill takes less than 2 seconds.      Coloration: Skin is not jaundiced or pale.   Neurological:      General: No focal deficit present.      Mental Status: He is alert and oriented to person, place, and time.      Motor: No weakness.      Gait: Gait normal.   Psychiatric:         Mood and Affect: Mood normal.         Behavior: Behavior normal.         Thought Content: Thought content normal.         Judgment: Judgment normal.        Laboratory:      CMP:  Recent Labs   Lab 11/01/21  1550   Glucose 143 H   Calcium 9.1   Albumin 4.2   Total Protein 7.8   Sodium 138   Potassium 3.7   CO2 27   Chloride 105   BUN 17   Alkaline Phosphatase 79   ALT 29   AST 15   Total Bilirubin 0.8          LIPIDS:  Recent Labs   Lab 11/01/21  1550   HDL 49   Cholesterol 194   Triglycerides 100   LDL Cholesterol 125.0   HDL/Cholesterol Ratio 25.3   Non-HDL Cholesterol 145   Total Cholesterol/HDL Ratio 4.0           Assessment/Plan     Srinivas Huynh is a 26 y.o.male with:    Problem List Items Addressed This Visit        ENT    Environmental and seasonal allergies    Relevant Medications    cetirizine (ZYRTEC) 10 MG tablet       Endocrine    BMI 28.0-28.9,adult    Current Assessment & Plan     Wt Readings from Last 3 Encounters:   05/03/22 1028 83.6 kg (184  lb 4.9 oz)   04/06/22 1013 82.5 kg (181 lb 14.1 oz)   02/15/22 1037 93 kg (205 lb)     BMI increase to 28. There is weight gain, unintentional likely contributed by imbalance in diet and reduced physical activity.   - stay physically active  - maintain daily adequate hydration 1.5 to 2L fluid volume  - as tolerated incorporate resistance training with stretching and cardio  - goal of 150 minutes per week of moderate intensity activity or 7,500 - 10,000 steps per day  - follow the Mediterranean diet  - include whole fresh fruits, vegetables, olive oil, seeds, nuts, whole grains, cold water fish, salmon, mackerel and lean cuts of meat    - do not drink sugary/diet carbonated beverages  - decrease portion sizes slightly which will result in an approximately 500-calorie deficit  - avoid fast or fried and processed food, especially canned foods   - avoid refined carbohydrates, white starchy foods, flour, white potato, bread, muffins, and cakes                GI    Gastroenteritis - Primary        Other than changes above, cont current medications and maintain follow up with specialists.  No follow-ups on file.    No future appointments.    Kazumi G Yoshinaga, DO [facility]

## 2022-07-11 ENCOUNTER — OFFICE VISIT (OUTPATIENT)
Dept: PRIMARY CARE CLINIC | Facility: CLINIC | Age: 27
End: 2022-07-11
Payer: MEDICAID

## 2022-07-11 VITALS
RESPIRATION RATE: 16 BRPM | DIASTOLIC BLOOD PRESSURE: 77 MMHG | TEMPERATURE: 98 F | HEIGHT: 68 IN | OXYGEN SATURATION: 98 % | HEART RATE: 87 BPM | WEIGHT: 193.81 LBS | BODY MASS INDEX: 29.37 KG/M2 | SYSTOLIC BLOOD PRESSURE: 122 MMHG

## 2022-07-11 DIAGNOSIS — M25.511 CHRONIC RIGHT SHOULDER PAIN: ICD-10-CM

## 2022-07-11 DIAGNOSIS — S46.911A RIGHT SHOULDER STRAIN, INITIAL ENCOUNTER: Primary | ICD-10-CM

## 2022-07-11 DIAGNOSIS — G89.29 CHRONIC RIGHT SHOULDER PAIN: ICD-10-CM

## 2022-07-11 DIAGNOSIS — M99.07 SOMATIC DYSFUNCTION OF UPPER EXTREMITY: ICD-10-CM

## 2022-07-11 DIAGNOSIS — M99.01 SOMATIC DYSFUNCTION OF CERVICAL REGION: ICD-10-CM

## 2022-07-11 PROCEDURE — 98925 PR OSTEOPATHIC MANIP,1-2 BODY REGN: ICD-10-PCS | Mod: S$PBB,,, | Performed by: STUDENT IN AN ORGANIZED HEALTH CARE EDUCATION/TRAINING PROGRAM

## 2022-07-11 PROCEDURE — 99999 PR PBB SHADOW E&M-EST. PATIENT-LVL III: CPT | Mod: PBBFAC,,, | Performed by: STUDENT IN AN ORGANIZED HEALTH CARE EDUCATION/TRAINING PROGRAM

## 2022-07-11 PROCEDURE — 3074F PR MOST RECENT SYSTOLIC BLOOD PRESSURE < 130 MM HG: ICD-10-PCS | Mod: CPTII,,, | Performed by: STUDENT IN AN ORGANIZED HEALTH CARE EDUCATION/TRAINING PROGRAM

## 2022-07-11 PROCEDURE — 3078F DIAST BP <80 MM HG: CPT | Mod: CPTII,,, | Performed by: STUDENT IN AN ORGANIZED HEALTH CARE EDUCATION/TRAINING PROGRAM

## 2022-07-11 PROCEDURE — 98925 OSTEOPATH MANJ 1-2 REGIONS: CPT | Mod: PBBFAC,PN | Performed by: STUDENT IN AN ORGANIZED HEALTH CARE EDUCATION/TRAINING PROGRAM

## 2022-07-11 PROCEDURE — 99999 PR PBB SHADOW E&M-EST. PATIENT-LVL III: ICD-10-PCS | Mod: PBBFAC,,, | Performed by: STUDENT IN AN ORGANIZED HEALTH CARE EDUCATION/TRAINING PROGRAM

## 2022-07-11 PROCEDURE — 1159F MED LIST DOCD IN RCRD: CPT | Mod: CPTII,,, | Performed by: STUDENT IN AN ORGANIZED HEALTH CARE EDUCATION/TRAINING PROGRAM

## 2022-07-11 PROCEDURE — 3008F BODY MASS INDEX DOCD: CPT | Mod: CPTII,,, | Performed by: STUDENT IN AN ORGANIZED HEALTH CARE EDUCATION/TRAINING PROGRAM

## 2022-07-11 PROCEDURE — 3078F PR MOST RECENT DIASTOLIC BLOOD PRESSURE < 80 MM HG: ICD-10-PCS | Mod: CPTII,,, | Performed by: STUDENT IN AN ORGANIZED HEALTH CARE EDUCATION/TRAINING PROGRAM

## 2022-07-11 PROCEDURE — 1159F PR MEDICATION LIST DOCUMENTED IN MEDICAL RECORD: ICD-10-PCS | Mod: CPTII,,, | Performed by: STUDENT IN AN ORGANIZED HEALTH CARE EDUCATION/TRAINING PROGRAM

## 2022-07-11 PROCEDURE — 98925 OSTEOPATH MANJ 1-2 REGIONS: CPT | Mod: S$PBB,,, | Performed by: STUDENT IN AN ORGANIZED HEALTH CARE EDUCATION/TRAINING PROGRAM

## 2022-07-11 PROCEDURE — 99213 PR OFFICE/OUTPT VISIT, EST, LEVL III, 20-29 MIN: ICD-10-PCS | Mod: 25,S$PBB,, | Performed by: STUDENT IN AN ORGANIZED HEALTH CARE EDUCATION/TRAINING PROGRAM

## 2022-07-11 PROCEDURE — 3074F SYST BP LT 130 MM HG: CPT | Mod: CPTII,,, | Performed by: STUDENT IN AN ORGANIZED HEALTH CARE EDUCATION/TRAINING PROGRAM

## 2022-07-11 PROCEDURE — 99213 OFFICE O/P EST LOW 20 MIN: CPT | Mod: 25,S$PBB,, | Performed by: STUDENT IN AN ORGANIZED HEALTH CARE EDUCATION/TRAINING PROGRAM

## 2022-07-11 PROCEDURE — 3008F PR BODY MASS INDEX (BMI) DOCUMENTED: ICD-10-PCS | Mod: CPTII,,, | Performed by: STUDENT IN AN ORGANIZED HEALTH CARE EDUCATION/TRAINING PROGRAM

## 2022-07-11 PROCEDURE — 99213 OFFICE O/P EST LOW 20 MIN: CPT | Mod: PBBFAC,PN | Performed by: STUDENT IN AN ORGANIZED HEALTH CARE EDUCATION/TRAINING PROGRAM

## 2022-07-11 RX ORDER — IBUPROFEN 600 MG/1
600 TABLET ORAL 3 TIMES DAILY
Qty: 42 TABLET | Refills: 0 | Status: SHIPPED | OUTPATIENT
Start: 2022-07-11 | End: 2022-07-25

## 2022-07-11 RX ORDER — DEXTROMETHORPHAN HYDROBROMIDE, GUAIFENESIN 5; 100 MG/5ML; MG/5ML
650 LIQUID ORAL EVERY 8 HOURS
Qty: 42 TABLET | Refills: 0 | Status: SHIPPED | OUTPATIENT
Start: 2022-07-11 | End: 2022-07-25

## 2022-07-12 PROBLEM — M99.09 SOMATIC DYSFUNCTION OF BACK: Status: RESOLVED | Noted: 2022-01-04 | Resolved: 2022-07-12

## 2022-07-12 PROBLEM — S46.911A RIGHT SHOULDER STRAIN, INITIAL ENCOUNTER: Status: ACTIVE | Noted: 2022-07-12

## 2022-07-12 PROBLEM — M99.09 SOMATIC DYSFUNCTION OF BACK: Status: RESOLVED | Noted: 2022-01-04 | Resolved: 2022-04-06

## 2022-07-12 PROBLEM — M99.07 SOMATIC DYSFUNCTION OF UPPER EXTREMITY: Status: ACTIVE | Noted: 2022-07-12

## 2022-07-12 PROBLEM — M99.01 SOMATIC DYSFUNCTION OF CERVICAL REGION: Status: ACTIVE | Noted: 2022-07-12

## 2022-07-12 NOTE — ASSESSMENT & PLAN NOTE
"OMT EXAMINATION AND PROCEDURE NOTE  Somatic dysfunction was diagnosed in the following body regions as noted based on tissue texture, tenderness, asymmetry and/or restricted motion. The corresponding ICD-10 codes are including for each somatic dysfunction diagnosed independently of any other diagnosis listed.  M99.1 Cervical Region: OA, AA C1-C7  M99.2 Thoracic Region: CT junction  M99.7 Upper Extremity: right shoulder    OMT was performed after verbal informed consent on each of the somatic dysfunctions listed above.  All areas were noted to be improved upon re-examination after treatment.  The following OMT techniques were employed: OA release, soft tissue over cervical and thoracic paraspinal regions, rib raising, Arturo's technique with muscle energy    The patient noted no adverse effects and some relief of symptoms immediately following treatment. Side effects were discussed. Patient was discharged with home care instructions and anticipatory guidance.    The following CPT code is applied (marked with "x"):    22889 OMT to  1-2 regions: X    Abbreviations Used: BLT - balanced ligamentous tension; CH - Pillai reflexes; CR - cranial; CS - counterstrain; , FPR - facilitated positional release; HVLA - high velocity, low amplitude; LYMPH -lymphatic; ME - muscle energy; MFR - myofascial release; ligamentous articular strain; OMT -osteopathic manipulative treatment; ST - soft tissue; VISC - visceral  "

## 2022-07-12 NOTE — ASSESSMENT & PLAN NOTE
Worsened by recent overuse and a lot of overhead work activities. Counseled on supportive management to address the acute discomfort. No bony abnormalities appreciated on exam.   - counseled on importance of strengthening shoulder girdle muscles as provided with print out  - f/u 4 weeks with worsening

## 2022-07-12 NOTE — PROGRESS NOTES
[facility] Note    Chief Complaint      Chief Complaint   Patient presents with    Shoulder Pain     Right Shoulder pain--started hurting Friday.         History of Present Illness      Srinivas Huynh is a 27 y.o. male who presents today for Shoulder Pain (Right Shoulder pain--started hurting Friday.  )  The pain worsened after a lot of over head activities at work.   He has taken couple of ibuprofen 400 mg twice a day since Friday.    Past Medical History:  Past Medical History:   Diagnosis Date    Bronchitis     Diarrhea of presumed infectious origin 12/1/2021    Fever 12/1/2021    Fracture of first metacarpal     High cholesterol     High triglycerides     HLD (hyperlipidemia)     Left hand fracture     Marijuana use 1/9/2019    Muscle spasms of neck 11/1/2021    Obesity 1/9/2019    Smoker 1/9/2019    Spasm of muscle of lower back 1/4/2022    Vomiting 12/8/2021    Wheezing 11/29/2021     Past Surgical History:   has a past surgical history that includes sinus sx; Myringotomy w/ tubes; Adenoidectomy; and Closed reduction of fracture of hand with percutaneous pinning (Left, 1/10/2019).    Family History:  family history includes Cancer in his maternal grandfather, maternal grandmother, paternal grandfather, and paternal grandmother; Diabetes in his maternal grandfather and maternal grandmother; Heart disease in his father, maternal grandfather, maternal grandmother, paternal grandfather, and paternal grandmother; Hyperlipidemia in his father, maternal grandfather, maternal grandmother, paternal grandfather, and paternal grandmother; Hypertension in his maternal grandfather and maternal grandmother.     Social History:  Social History     Tobacco Use    Smoking status: Current Every Day Smoker     Packs/day: 1.00     Years: 12.00     Pack years: 12.00     Types: Cigarettes    Smokeless tobacco: Never Used   Substance Use Topics    Alcohol use: No    Drug use: Not Currently      "Types: Marijuana     Comment: 2 joints daily, 2 g daily per patient       I personally reviewed all past medical, surgical, social and family history.    ROS     Medications:  Outpatient Encounter Medications as of 7/11/2022   Medication Sig Dispense Refill    cetirizine (ZYRTEC) 10 MG tablet Take 1 tablet (10 mg total) by mouth once daily. 30 tablet 2    acetaminophen (TYLENOL) 650 MG TbSR Take 1 tablet (650 mg total) by mouth every 8 (eight) hours. Tylenol arthritis 8hr Extended release for 14 days 42 tablet 0    ibuprofen (ADVIL,MOTRIN) 600 MG tablet Take 1 tablet (600 mg total) by mouth 3 (three) times daily. for 14 days 42 tablet 0    [DISCONTINUED] acetaminophen (TYLENOL) 650 MG TbSR Take 650 mg by mouth every 8 (eight) hours. Tylenol arthritis 8hr Extended release       No facility-administered encounter medications on file as of 7/11/2022.       Allergies:  Review of patient's allergies indicates:  No Known Allergies    Health Maintenance:  Immunization History   Administered Date(s) Administered    COVID-19, MRNA, LN-S, PF (MODERNA FULL 0.5 ML DOSE) 09/14/2021    Influenza - Quadrivalent - PF *Preferred* (6 months and older) 11/29/2021      Health Maintenance   Topic Date Due    TETANUS VACCINE  11/01/2022 (Originally 5/21/2015)    HPV Vaccines (1 - Male 2-dose series) 11/01/2022 (Originally 5/21/2008)    Hepatitis C Screening  Completed    Lipid Panel  Completed        Physical Exam      Vital Signs  Temp: 98 °F (36.7 °C)  Temp src: Oral  Pulse: 87  Resp: 16  SpO2: 98 %  BP: 122/77  BP Location: Right arm  Patient Position: Sitting  Pain Score:   6  Pain Loc: Shoulder  Height and Weight  Height: 5' 8" (172.7 cm)  Weight: 87.9 kg (193 lb 12.6 oz)  BSA (Calculated - sq m): 2.05 sq meters  BMI (Calculated): 29.5  Weight in (lb) to have BMI = 25: 164.1]    Physical Exam  Vitals and nursing note reviewed.   Constitutional:       General: He is not in acute distress.     Appearance: Normal appearance. " He is normal weight. He is not toxic-appearing or diaphoretic.   HENT:      Head: Normocephalic and atraumatic.      Right Ear: External ear normal.      Left Ear: External ear normal.      Nose: Nose normal.      Mouth/Throat:      Mouth: Mucous membranes are moist.      Pharynx: Oropharynx is clear.   Eyes:      Extraocular Movements: Extraocular movements intact.      Conjunctiva/sclera: Conjunctivae normal.   Cardiovascular:      Rate and Rhythm: Normal rate and regular rhythm.      Pulses: Normal pulses.      Heart sounds: Normal heart sounds. No murmur heard.     Comments: + radial pulse bilaterally  Pulmonary:      Effort: Pulmonary effort is normal. No respiratory distress.      Breath sounds: Normal breath sounds. No wheezing.   Abdominal:      General: Abdomen is flat. Bowel sounds are normal.      Palpations: Abdomen is soft. There is no mass.      Tenderness: There is no right CVA tenderness or left CVA tenderness.   Musculoskeletal:         General: Tenderness present. No signs of injury.      Right shoulder: Tenderness (right supraspinatus ) present. No swelling, laceration or crepitus. Normal range of motion (flexion, abduction, adduction, external and internal rotation). Normal strength. Normal pulse.      Left shoulder: Decreased range of motion.      Right elbow: No tenderness.      Right wrist: Normal.      Left wrist: Normal.      Right hand: No swelling or lacerations. Decreased strength (). Normal strength of finger abduction, thumb/finger opposition and wrist extension.      Left hand: Normal.      Cervical back: Normal range of motion. Rigidity (reduced right rotation) and tenderness (paraspinals) present.      Right lower leg: No edema.      Left lower leg: No edema.      Comments: Apley's restriction with left abduction. Mild right winging of scapula, improved from past exam. Paraspinal fullness over cervical and thoracic spine. C3-5 RRLS.   Lymphadenopathy:      Cervical: No cervical  adenopathy.   Skin:     General: Skin is warm and dry.   Neurological:      General: No focal deficit present.      Mental Status: He is alert and oriented to person, place, and time.      Motor: No weakness.      Gait: Gait normal.   Psychiatric:         Mood and Affect: Mood normal.         Behavior: Behavior normal.       Assessment/Plan     Srinivas Huynh is a 27 y.o.male with:    Problem List Items Addressed This Visit        Endocrine    BMI 28.0-28.9,adult    Current Assessment & Plan     Wt Readings from Last 3 Encounters:   07/11/22 0955 87.9 kg (193 lb 12.6 oz)   05/03/22 1028 83.6 kg (184 lb 4.9 oz)   04/06/22 1013 82.5 kg (181 lb 14.1 oz)     BMI increase to 29, there is over ten pounds of weight gain over the past three months. More likely contributed by imbalance in diet and reduced physical activity. Counseled at length on below diet and physical exercise modifications.    - stay physically active  - maintain daily adequate hydration 1.5 to 2L fluid volume  - as tolerated incorporate resistance training with stretching and cardio  - goal of 150 minutes per week of moderate intensity activity or 7,500 - 10,000 steps per day  - follow the Mediterranean diet  - include whole fresh fruits, vegetables, olive oil, seeds, nuts, whole grains, cold water fish, salmon, mackerel and lean cuts of meat    - do not drink sugary/diet carbonated beverages  - decrease portion sizes slightly which will result in an approximately 500-calorie deficit  - avoid fast or fried and processed food, especially canned foods   - avoid refined carbohydrates, white starchy foods, flour, white potato, bread, muffins, and cakes                Orthopedic    Chronic right shoulder pain    Current Assessment & Plan     Worsened by recent overuse and a lot of overhead work activities. Counseled on supportive management to address the acute discomfort. No bony abnormalities appreciated on exam.   - counseled on importance of strengthening  "shoulder girdle muscles as provided with print out  - f/u 4 weeks with worsening             Somatic dysfunction of upper extremity    Current Assessment & Plan     OMT EXAMINATION AND PROCEDURE NOTE  Somatic dysfunction was diagnosed in the following body regions as noted based on tissue texture, tenderness, asymmetry and/or restricted motion. The corresponding ICD-10 codes are including for each somatic dysfunction diagnosed independently of any other diagnosis listed.  M99.1 Cervical Region: OA, AA C1-C7  M99.2 Thoracic Region: CT junction  M99.7 Upper Extremity: right shoulder    OMT was performed after verbal informed consent on each of the somatic dysfunctions listed above.  All areas were noted to be improved upon re-examination after treatment.  The following OMT techniques were employed: OA release, soft tissue over cervical and thoracic paraspinal regions, rib raising, Arturo's technique with muscle energy    The patient noted no adverse effects and some relief of symptoms immediately following treatment. Side effects were discussed. Patient was discharged with home care instructions and anticipatory guidance.    The following CPT code is applied (marked with "x"):    35526 OMT to  1-2 regions: X    Abbreviations Used: BLT - balanced ligamentous tension; CH - Pillai reflexes; CR - cranial; CS - counterstrain; , FPR - facilitated positional release; HVLA - high velocity, low amplitude; LYMPH -lymphatic; ME - muscle energy; MFR - myofascial release; ligamentous articular strain; OMT -osteopathic manipulative treatment; ST - soft tissue; VISC - visceral           Somatic dysfunction of cervical region    Current Assessment & Plan     See above assessment and management.              Other    Right shoulder strain, initial encounter - Primary    Current Assessment & Plan     - avoid repetitive motion, and stop or takes breaks when certain motions hurt more.   - continue with daily stretch and shoulder " exercises to help strengthen the supporting muscles  - maintain daily adequate hydration  - stay physically active   - ease into weight bearing exercises for strengthening  - eat whole vegetables and fruits, eat a healthy balanced diet   - Tylenol + ibuprofen Q6-8 hours for pain next 5-7 days  - alternate between application of hot and cold compress over shoulders to promote healing, ease pain and swelling  - f/u 2 weeks with no improvement or worsening           Relevant Medications    ibuprofen (ADVIL,MOTRIN) 600 MG tablet    acetaminophen (TYLENOL) 650 MG TbSR          Other than changes above, cont current medications and maintain follow up with specialists.  Follow up in about 2 weeks (around 7/25/2022) for Worsening right shoulder pain.    Future Appointments   Date Time Provider Department Center   7/25/2022  8:30 AM Shonda Weston DO [facility]       Kazumi G Yoshinaga, DO [facility]

## 2022-07-12 NOTE — ASSESSMENT & PLAN NOTE
- avoid repetitive motion, and stop or takes breaks when certain motions hurt more.   - continue with daily stretch and shoulder exercises to help strengthen the supporting muscles  - maintain daily adequate hydration  - stay physically active   - ease into weight bearing exercises for strengthening  - eat whole vegetables and fruits, eat a healthy balanced diet   - Tylenol + ibuprofen Q6-8 hours for pain next 5-7 days  - alternate between application of hot and cold compress over shoulders to promote healing, ease pain and swelling  - f/u 2 weeks with no improvement or worsening

## 2022-07-12 NOTE — ASSESSMENT & PLAN NOTE
Wt Readings from Last 3 Encounters:   07/11/22 0955 87.9 kg (193 lb 12.6 oz)   05/03/22 1028 83.6 kg (184 lb 4.9 oz)   04/06/22 1013 82.5 kg (181 lb 14.1 oz)     BMI increase to 29, there is over ten pounds of weight gain over the past three months. More likely contributed by imbalance in diet and reduced physical activity. Counseled at length on below diet and physical exercise modifications.    - stay physically active  - maintain daily adequate hydration 1.5 to 2L fluid volume  - as tolerated incorporate resistance training with stretching and cardio  - goal of 150 minutes per week of moderate intensity activity or 7,500 - 10,000 steps per day  - follow the Mediterranean diet  - include whole fresh fruits, vegetables, olive oil, seeds, nuts, whole grains, cold water fish, salmon, mackerel and lean cuts of meat    - do not drink sugary/diet carbonated beverages  - decrease portion sizes slightly which will result in an approximately 500-calorie deficit  - avoid fast or fried and processed food, especially canned foods   - avoid refined carbohydrates, white starchy foods, flour, white potato, bread, muffins, and cakes

## 2022-11-17 ENCOUNTER — OFFICE VISIT (OUTPATIENT)
Dept: PRIMARY CARE CLINIC | Facility: CLINIC | Age: 27
End: 2022-11-17
Payer: MEDICAID

## 2022-11-17 VITALS
HEIGHT: 68 IN | WEIGHT: 190 LBS | DIASTOLIC BLOOD PRESSURE: 74 MMHG | OXYGEN SATURATION: 97 % | HEART RATE: 93 BPM | SYSTOLIC BLOOD PRESSURE: 126 MMHG | TEMPERATURE: 99 F | RESPIRATION RATE: 14 BRPM | BODY MASS INDEX: 28.79 KG/M2

## 2022-11-17 DIAGNOSIS — J06.9 UPPER RESPIRATORY INFECTION, ACUTE: Primary | ICD-10-CM

## 2022-11-17 DIAGNOSIS — J30.89 ENVIRONMENTAL AND SEASONAL ALLERGIES: ICD-10-CM

## 2022-11-17 DIAGNOSIS — H61.21 IMPACTED CERUMEN OF RIGHT EAR: ICD-10-CM

## 2022-11-17 PROCEDURE — 99999 PR PBB SHADOW E&M-EST. PATIENT-LVL III: CPT | Mod: PBBFAC,,, | Performed by: STUDENT IN AN ORGANIZED HEALTH CARE EDUCATION/TRAINING PROGRAM

## 2022-11-17 PROCEDURE — 1159F MED LIST DOCD IN RCRD: CPT | Mod: CPTII,,, | Performed by: STUDENT IN AN ORGANIZED HEALTH CARE EDUCATION/TRAINING PROGRAM

## 2022-11-17 PROCEDURE — 99999 PR PBB SHADOW E&M-EST. PATIENT-LVL III: ICD-10-PCS | Mod: PBBFAC,,, | Performed by: STUDENT IN AN ORGANIZED HEALTH CARE EDUCATION/TRAINING PROGRAM

## 2022-11-17 PROCEDURE — 99213 PR OFFICE/OUTPT VISIT, EST, LEVL III, 20-29 MIN: ICD-10-PCS | Mod: S$PBB,,, | Performed by: STUDENT IN AN ORGANIZED HEALTH CARE EDUCATION/TRAINING PROGRAM

## 2022-11-17 PROCEDURE — 3008F PR BODY MASS INDEX (BMI) DOCUMENTED: ICD-10-PCS | Mod: CPTII,,, | Performed by: STUDENT IN AN ORGANIZED HEALTH CARE EDUCATION/TRAINING PROGRAM

## 2022-11-17 PROCEDURE — 3074F PR MOST RECENT SYSTOLIC BLOOD PRESSURE < 130 MM HG: ICD-10-PCS | Mod: CPTII,,, | Performed by: STUDENT IN AN ORGANIZED HEALTH CARE EDUCATION/TRAINING PROGRAM

## 2022-11-17 PROCEDURE — 1160F RVW MEDS BY RX/DR IN RCRD: CPT | Mod: CPTII,,, | Performed by: STUDENT IN AN ORGANIZED HEALTH CARE EDUCATION/TRAINING PROGRAM

## 2022-11-17 PROCEDURE — 1159F PR MEDICATION LIST DOCUMENTED IN MEDICAL RECORD: ICD-10-PCS | Mod: CPTII,,, | Performed by: STUDENT IN AN ORGANIZED HEALTH CARE EDUCATION/TRAINING PROGRAM

## 2022-11-17 PROCEDURE — 3078F PR MOST RECENT DIASTOLIC BLOOD PRESSURE < 80 MM HG: ICD-10-PCS | Mod: CPTII,,, | Performed by: STUDENT IN AN ORGANIZED HEALTH CARE EDUCATION/TRAINING PROGRAM

## 2022-11-17 PROCEDURE — 99213 OFFICE O/P EST LOW 20 MIN: CPT | Mod: PBBFAC,PN | Performed by: STUDENT IN AN ORGANIZED HEALTH CARE EDUCATION/TRAINING PROGRAM

## 2022-11-17 PROCEDURE — 99213 OFFICE O/P EST LOW 20 MIN: CPT | Mod: S$PBB,,, | Performed by: STUDENT IN AN ORGANIZED HEALTH CARE EDUCATION/TRAINING PROGRAM

## 2022-11-17 PROCEDURE — 3008F BODY MASS INDEX DOCD: CPT | Mod: CPTII,,, | Performed by: STUDENT IN AN ORGANIZED HEALTH CARE EDUCATION/TRAINING PROGRAM

## 2022-11-17 PROCEDURE — 3078F DIAST BP <80 MM HG: CPT | Mod: CPTII,,, | Performed by: STUDENT IN AN ORGANIZED HEALTH CARE EDUCATION/TRAINING PROGRAM

## 2022-11-17 PROCEDURE — 1160F PR REVIEW ALL MEDS BY PRESCRIBER/CLIN PHARMACIST DOCUMENTED: ICD-10-PCS | Mod: CPTII,,, | Performed by: STUDENT IN AN ORGANIZED HEALTH CARE EDUCATION/TRAINING PROGRAM

## 2022-11-17 PROCEDURE — 3074F SYST BP LT 130 MM HG: CPT | Mod: CPTII,,, | Performed by: STUDENT IN AN ORGANIZED HEALTH CARE EDUCATION/TRAINING PROGRAM

## 2022-11-17 RX ORDER — CETIRIZINE HYDROCHLORIDE 10 MG/1
10 TABLET ORAL DAILY
Qty: 30 TABLET | Refills: 11 | Status: SHIPPED | OUTPATIENT
Start: 2022-11-17 | End: 2023-11-17

## 2022-11-17 RX ORDER — PROMETHAZINE HYDROCHLORIDE AND DEXTROMETHORPHAN HYDROBROMIDE 6.25; 15 MG/5ML; MG/5ML
5 SYRUP ORAL EVERY 8 HOURS PRN
Qty: 180 ML | Refills: 0 | Status: SHIPPED | OUTPATIENT
Start: 2022-11-17 | End: 2022-11-27

## 2022-11-17 RX ORDER — CETIRIZINE HYDROCHLORIDE 10 MG/1
10 TABLET ORAL DAILY
Qty: 30 TABLET | Refills: 2 | Status: SHIPPED | OUTPATIENT
Start: 2022-11-17 | End: 2023-02-15

## 2022-11-17 NOTE — PROGRESS NOTES
Subjective:       Patient ID: Srinivas Huynh is a 27 y.o. male.    Chief Complaint: Sore Throat (Sore  throat, earache, headache, slight cough.  Since yesterday.  Patient states no fever. He state he did take tylenol yesterday/)  [employer] provides medical marijuana card and get to shop at the [employer] from   Using marijuana flower, black cherry smoking with joint pain.     27 year old male complains of sore throat and cough, earache, headache started yesterday. No associated GI symptoms. Able to tolerate PO intake of fluids and solids with no issues.   Denies fever, chills, headaches, excessive nausea, vomiting at this time.     Sore Throat   This is a new problem. The current episode started yesterday. The problem has been unchanged. There has been no fever. The pain is at a severity of 3/10. The pain is mild. Associated symptoms include coughing, ear pain, headaches and a plugged ear sensation. Pertinent negatives include no abdominal pain, congestion, diarrhea, ear discharge, hoarse voice, neck pain, shortness of breath, stridor, swollen glands, trouble swallowing or vomiting. He has tried NSAIDs, acetaminophen and oral narcotic analgesics for the symptoms. The treatment provided mild relief.   Review of Systems   HENT:  Positive for ear pain and sore throat. Negative for nasal congestion, ear discharge, hoarse voice and trouble swallowing.    Respiratory:  Positive for cough. Negative for shortness of breath and stridor.    Gastrointestinal:  Negative for abdominal pain, diarrhea and vomiting.   Musculoskeletal:  Negative for neck pain.   Neurological:  Positive for headaches.       Objective:      Physical Exam  Vitals and nursing note reviewed.   Constitutional:       General: He is not in acute distress.     Appearance: Normal appearance. He is normal weight. He is not toxic-appearing or diaphoretic.   HENT:      Head: Normocephalic and atraumatic.      Right Ear: External ear normal. There is impacted  cerumen.      Left Ear: Tympanic membrane, ear canal and external ear normal.      Nose: Nose normal. No congestion.      Mouth/Throat:      Mouth: Mucous membranes are dry.      Pharynx: Oropharynx is clear. No oropharyngeal exudate or posterior oropharyngeal erythema.   Eyes:      Extraocular Movements: Extraocular movements intact.      Conjunctiva/sclera: Conjunctivae normal.   Cardiovascular:      Rate and Rhythm: Normal rate and regular rhythm.      Pulses: Normal pulses.      Heart sounds: Normal heart sounds. No murmur heard.  Pulmonary:      Effort: Pulmonary effort is normal. No respiratory distress.      Breath sounds: Normal breath sounds. No wheezing, rhonchi or rales.   Chest:      Chest wall: No tenderness.   Abdominal:      General: Abdomen is flat. Bowel sounds are normal. There is no distension.      Palpations: Abdomen is soft. There is no mass.      Tenderness: There is no abdominal tenderness. There is no right CVA tenderness, left CVA tenderness or guarding.   Musculoskeletal:         General: Normal range of motion.      Right hand: Normal strength.      Cervical back: Normal range of motion and neck supple. No rigidity or tenderness.      Right lower leg: No edema.      Left lower leg: No edema.   Lymphadenopathy:      Cervical: No cervical adenopathy.   Skin:     General: Skin is warm and dry.      Capillary Refill: Capillary refill takes less than 2 seconds.      Coloration: Skin is not jaundiced or pale.   Neurological:      General: No focal deficit present.      Mental Status: He is alert and oriented to person, place, and time.      Motor: No weakness.      Gait: Gait normal.   Psychiatric:         Mood and Affect: Mood normal.         Behavior: Behavior normal.         Thought Content: Thought content normal.         Judgment: Judgment normal.     Assessment and Plan:       Problem List Items Addressed This Visit          ENT    Upper respiratory infection, acute - Primary    Relevant  Medications    promethazine-dextromethorphan (PROMETHAZINE-DM) 6.25-15 mg/5 mL Syrp    Environmental and seasonal allergies    Relevant Medications    cetirizine (ZYRTEC) 10 MG tablet    Impacted cerumen of right ear     Start debrox droplets to soften cerumen. Follow up 2 weeks for flush and removal.

## 2022-11-18 PROBLEM — H61.21 IMPACTED CERUMEN OF RIGHT EAR: Status: ACTIVE | Noted: 2022-11-17

## 2022-11-18 PROBLEM — H61.21 IMPACTED CERUMEN OF RIGHT EAR: Status: ACTIVE | Noted: 2022-11-18
